# Patient Record
Sex: FEMALE | Employment: UNEMPLOYED | URBAN - METROPOLITAN AREA
[De-identification: names, ages, dates, MRNs, and addresses within clinical notes are randomized per-mention and may not be internally consistent; named-entity substitution may affect disease eponyms.]

---

## 2019-07-17 ENCOUNTER — HOSPITAL ENCOUNTER (OUTPATIENT)
Dept: LAB | Age: 49
Discharge: HOME | End: 2019-07-17
Payer: MEDICARE

## 2019-07-17 ENCOUNTER — HOSPITAL ENCOUNTER (OUTPATIENT)
Dept: GENERAL RADIOLOGY | Age: 49
Discharge: HOME | End: 2019-07-17
Payer: MEDICARE

## 2019-07-17 DIAGNOSIS — J45.50 SEVERE PERSISTENT ASTHMA, UNCOMPLICATED: ICD-10-CM

## 2019-07-17 LAB
BASOPHILS # BLD: 0 K/UL (ref 0–0.1)
BASOPHILS NFR BLD: 1 % (ref 0–2)
DIFFERENTIAL METHOD BLD: NORMAL
EOSINOPHIL # BLD: 0.2 K/UL (ref 0–0.4)
EOSINOPHIL NFR BLD: 3 % (ref 0–5)
ERYTHROCYTE [DISTWIDTH] IN BLOOD BY AUTOMATED COUNT: 14.1 % (ref 11.6–14.5)
HCT VFR BLD AUTO: 39.9 % (ref 35–45)
HGB BLD-MCNC: 12.6 G/DL (ref 12–16)
LYMPHOCYTES # BLD: 1.6 K/UL (ref 0.9–3.6)
LYMPHOCYTES NFR BLD: 27 % (ref 21–52)
MCH RBC QN AUTO: 26.8 PG (ref 24–34)
MCHC RBC AUTO-ENTMCNC: 31.6 G/DL (ref 31–37)
MCV RBC AUTO: 84.9 FL (ref 74–97)
MONOCYTES # BLD: 0.5 K/UL (ref 0.05–1.2)
MONOCYTES NFR BLD: 9 % (ref 3–10)
NEUTS SEG # BLD: 3.5 K/UL (ref 1.8–8)
NEUTS SEG NFR BLD: 60 % (ref 40–73)
PLATELET # BLD AUTO: 281 K/UL (ref 135–420)
PMV BLD AUTO: 10.9 FL (ref 9.2–11.8)
RBC # BLD AUTO: 4.7 M/UL (ref 4.2–5.3)
WBC # BLD AUTO: 5.8 K/UL (ref 4.6–13.2)

## 2019-07-17 PROCEDURE — 71046 X-RAY EXAM CHEST 2 VIEWS: CPT

## 2019-07-17 PROCEDURE — 82785 ASSAY OF IGE: CPT

## 2019-07-17 PROCEDURE — 36415 COLL VENOUS BLD VENIPUNCTURE: CPT

## 2019-07-17 PROCEDURE — 86003 ALLG SPEC IGE CRUDE XTRC EA: CPT

## 2019-07-17 PROCEDURE — 85025 COMPLETE CBC W/AUTO DIFF WBC: CPT

## 2019-07-20 LAB
A ALTERNATA IGE QN: <0.1 KU/L
A FUMIGATUS IGE QN: <0.1 KU/L
AMER ROACH IGE QN: <0.1 KU/L
AMER SYCAMORE IGE QN: <0.1 KU/L
BAHIA GRASS IGE QN: <0.1 KU/L
BERMUDA GRASS IGE QN: <0.1 KU/L
BOXELDER IGE QN: <0.1 KU/L
C HERBARUM IGE QN: <0.1 KU/L
CAT DANDER IGG QN: <0.1 KU/L
CLASS DESCRIPTION, 600268: ABNORMAL
COMMON RAGWEED IGE QN: <0.1 KU/L
D FARINAE IGE QN: 8.6 KU/L
D PTERONYSS IGE QN: 21.2 KU/L
DEPRECATED IGE QN: <0.1 KU/L
DOG DANDER IGE QN: <0.1 KU/L
ENGL PLANTAIN IGE QN: <0.1 KU/L
IGE SERPL-ACNC: 121 IU/ML (ref 6–495)
JOHNSON GRASS IGE QN: <0.1 KU/L
M RACEMOSUS IGE QN: <0.1 KU/L
MT JUNIPER IGE QN: <0.1 KU/L
MUGWORT IGE QN: <0.1 KU/L
NETTLE IGE QN: <0.1 KU/L
P NOTATUM IGE QN: <0.1 KU/L
S BOTRYOSUM IGE QN: <0.1 KU/L
SHEEP SORREL IGE QN: <0.1 KU/L
SWEET GUM IGE QN: <0.1 KU/L
TIMOTHY IGE QN: <0.1 KU/L
WHITE BIRCH IGE QN: <0.1 KU/L
WHITE ELM IGG QN: <0.1 KU/L
WHITE HICKORY IGE QN: <0.1 KU/L
WHITE MULBERRY IGE QN: <0.1 KU/L
WHITE OAK IGE QN: <0.1 KU/L

## 2019-10-10 ENCOUNTER — OFFICE VISIT (OUTPATIENT)
Dept: FAMILY MEDICINE CLINIC | Age: 49
End: 2019-10-10

## 2019-10-10 VITALS
TEMPERATURE: 97.4 F | OXYGEN SATURATION: 97 % | WEIGHT: 206 LBS | HEIGHT: 57 IN | DIASTOLIC BLOOD PRESSURE: 125 MMHG | SYSTOLIC BLOOD PRESSURE: 147 MMHG | RESPIRATION RATE: 14 BRPM | BODY MASS INDEX: 44.44 KG/M2 | HEART RATE: 90 BPM

## 2019-10-10 DIAGNOSIS — F41.9 ANXIETY AND DEPRESSION: Primary | ICD-10-CM

## 2019-10-10 DIAGNOSIS — J45.909 PERSISTENT ASTHMA WITHOUT COMPLICATION, UNSPECIFIED ASTHMA SEVERITY: ICD-10-CM

## 2019-10-10 DIAGNOSIS — R03.0 ELEVATED BP WITHOUT DIAGNOSIS OF HYPERTENSION: ICD-10-CM

## 2019-10-10 DIAGNOSIS — Z12.39 BREAST CANCER SCREENING: ICD-10-CM

## 2019-10-10 DIAGNOSIS — F32.A ANXIETY AND DEPRESSION: Primary | ICD-10-CM

## 2019-10-10 DIAGNOSIS — Z12.31 ENCOUNTER FOR SCREENING MAMMOGRAM FOR MALIGNANT NEOPLASM OF BREAST: ICD-10-CM

## 2019-10-10 DIAGNOSIS — Z23 ENCOUNTER FOR IMMUNIZATION: ICD-10-CM

## 2019-10-10 DIAGNOSIS — E04.9 NODULAR GOITER: ICD-10-CM

## 2019-10-10 DIAGNOSIS — K21.9 GASTROESOPHAGEAL REFLUX DISEASE, ESOPHAGITIS PRESENCE NOT SPECIFIED: ICD-10-CM

## 2019-10-10 DIAGNOSIS — E03.9 HYPOTHYROIDISM, UNSPECIFIED TYPE: ICD-10-CM

## 2019-10-10 DIAGNOSIS — Z13.6 SCREENING FOR HEART DISEASE: ICD-10-CM

## 2019-10-10 RX ORDER — CITALOPRAM 20 MG/1
20 TABLET, FILM COATED ORAL DAILY
Qty: 90 TAB | Refills: 3 | Status: SHIPPED | OUTPATIENT
Start: 2019-10-10 | End: 2020-09-09 | Stop reason: SDUPTHER

## 2019-10-10 RX ORDER — QUETIAPINE FUMARATE 300 MG/1
300 TABLET, FILM COATED ORAL
Qty: 90 TAB | Refills: 3 | Status: SHIPPED | OUTPATIENT
Start: 2019-10-10 | End: 2020-02-19 | Stop reason: SDUPTHER

## 2019-10-10 RX ORDER — LEVOTHYROXINE SODIUM 100 UG/1
100 TABLET ORAL
COMMUNITY
End: 2019-12-02 | Stop reason: DRUGHIGH

## 2019-10-10 RX ORDER — BUDESONIDE AND FORMOTEROL FUMARATE DIHYDRATE 160; 4.5 UG/1; UG/1
2 AEROSOL RESPIRATORY (INHALATION) 2 TIMES DAILY
COMMUNITY

## 2019-10-10 RX ORDER — PHENOL/SODIUM PHENOLATE
20 AEROSOL, SPRAY (ML) MUCOUS MEMBRANE DAILY
COMMUNITY
End: 2019-10-10 | Stop reason: ALTCHOICE

## 2019-10-10 RX ORDER — RANITIDINE 300 MG/1
300 TABLET ORAL
Qty: 90 TAB | Refills: 3 | Status: SHIPPED | OUTPATIENT
Start: 2019-10-10 | End: 2020-09-17

## 2019-10-10 RX ORDER — QUETIAPINE FUMARATE 300 MG/1
300 TABLET, FILM COATED ORAL
COMMUNITY
End: 2019-10-10 | Stop reason: SDUPTHER

## 2019-10-10 RX ORDER — ALBUTEROL SULFATE 0.83 MG/ML
SOLUTION RESPIRATORY (INHALATION)
COMMUNITY

## 2019-10-10 RX ORDER — ALBUTEROL SULFATE 90 UG/1
AEROSOL, METERED RESPIRATORY (INHALATION)
COMMUNITY

## 2019-10-10 RX ORDER — MONTELUKAST SODIUM 10 MG/1
10 TABLET ORAL DAILY
COMMUNITY
End: 2020-01-08 | Stop reason: SDUPTHER

## 2019-10-10 RX ORDER — CITALOPRAM 20 MG/1
20 TABLET, FILM COATED ORAL DAILY
COMMUNITY
End: 2019-10-10 | Stop reason: SDUPTHER

## 2019-10-10 NOTE — PROGRESS NOTES
Lili Clark    CC: EOC for chronic disease management    HPI:     Asthma:  -Currently seeing Dr. Farideh Landers with pulmonary medicine  -Currently taking Symbicort, Singulair, and Spiriva  -States that asthma was recently exacerbated by a cold  -Next appointment with pulmonary medicine is on 10/29/2019      Thyroid Disease:  -Reports she was seeing endocrinology in [facility]  -Reports having hypothyroidism and being on levothyroxine for issue  -Has been off of levothyroxine for 4 weeks  -Concerned about enlargement of her thyroid as she is having issues with choking/breathing/swallowing  -Has history of multinodular goiter which was found on thyroid ultrasound on 11/15/2016  -Had a fine-needle biopsy done on 1/20/2017 that showed chronic lymphocytic thyroiditis  -Reports her family history is significant for her father and 2 sisters having thyroid disease  -States that one of her sisters was found to have thyroid cancer at 45 y.o.       GERD:  -Associated with a hiatal hernia (was found on EGD)  -Had been previously taking Prilosec for issue, but ran out of her medication  -Currently taking Gaviscon as needed for symptoms      Anxiety and Depression:  -Associated with insomnia  -Was seeing psychiatry in [facility]  -Has not established yet with any local psychiatrist  -Has been out of all of her psychiatric medication  -Denies any side effects from her psychiatric medications  -Reports that her mood issues were well controlled on her prior regimen  -Currently reports her psychiatric issues have been inadequately controlled      ROS: Positive items marked in RED  CON: fever, chills  Cardiovascular: palpitations, CP  Resp: SOB, cough  GI: nausea, vomiting, diarrhea  : dysuria, hematuria      Past Medical History:   Diagnosis Date    Allergies     Anxiety     Asthma     Chronic lymphocytic thyroiditis     1/13/2017    Depression     GERD (gastroesophageal reflux disease)     Goiter, nodular 11/15/2016    Hiatal hernia     Hypothyroidism     Insomnia        Past Surgical History:   Procedure Laterality Date    HX  SECTION  2004    HX  SECTION  2006    HX HYSTERECTOMY         Family History   Problem Relation Age of Onset    Thyroid Disease Father     Thyroid Disease Sister     Thyroid Cancer Sister 45       Social History     Socioeconomic History    Marital status:      Spouse name: Not on file    Number of children: Not on file    Years of education: Not on file    Highest education level: Not on file   Occupational History    Not on file   Social Needs    Financial resource strain: Not on file    Food insecurity:     Worry: Not on file     Inability: Not on file    Transportation needs:     Medical: Not on file     Non-medical: Not on file   Tobacco Use    Smoking status: Never Smoker    Smokeless tobacco: Never Used   Substance and Sexual Activity    Alcohol use: Never     Frequency: Never    Drug use: Never    Sexual activity: Yes     Partners: Male     Birth control/protection: Surgical   Lifestyle    Physical activity:     Days per week: Not on file     Minutes per session: Not on file    Stress: Not on file   Relationships    Social connections:     Talks on phone: Not on file     Gets together: Not on file     Attends Mu-ism service: Not on file     Active member of club or organization: Not on file     Attends meetings of clubs or organizations: Not on file     Relationship status: Not on file    Intimate partner violence:     Fear of current or ex partner: Not on file     Emotionally abused: Not on file     Physically abused: Not on file     Forced sexual activity: Not on file   Other Topics Concern    Not on file   Social History Narrative    Not on file       No Known Allergies      Current Outpatient Medications:     levothyroxine (SYNTHROID) 100 mcg tablet, Take 100 mcg by mouth Daily (before breakfast). , Disp: , Rfl:     montelukast (SINGULAIR) 10 mg tablet, Take 10 mg by mouth daily. , Disp: , Rfl:     QUEtiapine (SEROQUEL) 300 mg tablet, Take 300 mg by mouth nightly., Disp: , Rfl:     citalopram (CELEXA) 20 mg tablet, Take 20 mg by mouth daily. , Disp: , Rfl:     albuterol (PROVENTIL VENTOLIN) 2.5 mg /3 mL (0.083 %) nebu, by Nebulization route., Disp: , Rfl:     albuterol (VENTOLIN HFA) 90 mcg/actuation inhaler, Take  by inhalation. , Disp: , Rfl:     budesonide-formoterol (SYMBICORT) 160-4.5 mcg/actuation HFAA, Take 2 Puffs by inhalation two (2) times a day., Disp: , Rfl:     tiotropium (SPIRIVA WITH HANDIHALER) 18 mcg inhalation capsule, Take 1 Cap by inhalation daily. , Disp: , Rfl:     Omeprazole delayed release (PRILOSEC D/R) 20 mg tablet, Take 20 mg by mouth daily. , Disp: , Rfl:     Physical Exam:      BP (!) 147/125   Pulse 90   Temp 97.4 °F (36.3 °C) (Oral)   Resp 14   Ht 4' 9\" (1.448 m)   Wt 206 lb (93.4 kg)   SpO2 97%   BMI 44.58 kg/m²     General: obese habitus, NAD, conversant  Eyes: sclera clear bilaterally, no discharge noted, eyelids normal in appearance  HENT: NCAT  Lungs: CTAB, normal respiratory effort and rate  CV: RRR, no MRGs  ABD: soft, non-tender, non-distended, normal bowel sounds  Skin: normal temperature, turgor, color, and texture  Psych: alert and oriented to person, place and situation, normal affect  Neuro: speech normal, moving all extremities, gait normal      Assessment/Plan     Anxiety and Depression, Inadequately Controlled:  -EKG ordered and was negative for any QT prolongation  -Will resume on prior psychiatric medication regimen   -Given information on how to find a local psychiatrist that accepts her insurance  -Follow-up in 1 month      GERD, Inadequately Controlled:  -Likely cause of reported dysphasia  -Will officially discontinue prior Prilosec regimen  -Started on trial of ranitidine regimen  -Follow-up in 1 month      Hypothyroidism:  -Likely inadequately controlled based on history  -TSH and free T4 ordered  -Follow-up in 1 month      Multinodular Goiter:  -Thyroid ultrasound ordered  -TSH and free T4 ordered  -Follow-up in 1 month      Elevated BP:  -Suspect elevated BP is due to uncontrolled anxiety  -Will resume on prior anxiety medication  -Follow-up in 1 month      Persistent Asthma:  -Will defer management to pulmonary specialist  -Need to obtain records from pulmonary specialist for review  -Follow-up in 1 month      Health Maintenance:  -Mammogram ordered  -Influenza vaccine administered today        Stacy Vasquez MD  10/10/2019, 11:29 AM

## 2019-10-10 NOTE — PROGRESS NOTES
1. Have you been to the ER, urgent care clinic since your last visit? Hospitalized since your last visit? No    2. Have you seen or consulted any other health care providers outside of the [address] [place] [address] since your last visit? Include any pap smears or colon screening.  No

## 2019-10-22 ENCOUNTER — HOSPITAL ENCOUNTER (OUTPATIENT)
Dept: ULTRASOUND IMAGING | Age: 49
Discharge: HOME | End: 2019-10-22
Attending: FAMILY MEDICINE
Payer: MEDICARE

## 2019-10-22 ENCOUNTER — HOSPITAL ENCOUNTER (OUTPATIENT)
Dept: MAMMOGRAPHY | Age: 49
Discharge: HOME | End: 2019-10-22
Attending: FAMILY MEDICINE
Payer: MEDICARE

## 2019-10-22 DIAGNOSIS — Z12.39 BREAST CANCER SCREENING: ICD-10-CM

## 2019-10-22 DIAGNOSIS — Z12.31 ENCOUNTER FOR SCREENING MAMMOGRAM FOR MALIGNANT NEOPLASM OF BREAST: ICD-10-CM

## 2019-10-22 DIAGNOSIS — E04.9 NODULAR GOITER: ICD-10-CM

## 2019-10-22 PROCEDURE — 76536 US EXAM OF HEAD AND NECK: CPT

## 2019-10-22 PROCEDURE — 77063 BREAST TOMOSYNTHESIS BI: CPT

## 2019-10-25 PROBLEM — J45.909 PERSISTENT ASTHMA WITHOUT COMPLICATION: Status: ACTIVE | Noted: 2019-10-25

## 2019-10-25 PROBLEM — F41.9 ANXIETY AND DEPRESSION: Status: ACTIVE | Noted: 2019-10-25

## 2019-10-25 PROBLEM — F32.A ANXIETY AND DEPRESSION: Status: ACTIVE | Noted: 2019-10-25

## 2019-10-25 PROBLEM — K21.9 GASTROESOPHAGEAL REFLUX DISEASE: Status: ACTIVE | Noted: 2019-10-25

## 2019-11-15 ENCOUNTER — HOSPITAL ENCOUNTER (OUTPATIENT)
Dept: LAB | Age: 49
Discharge: HOME | End: 2019-11-15
Payer: MEDICARE

## 2019-11-15 DIAGNOSIS — E04.9 NODULAR GOITER: ICD-10-CM

## 2019-11-15 PROCEDURE — 36415 COLL VENOUS BLD VENIPUNCTURE: CPT

## 2019-11-15 PROCEDURE — 84439 ASSAY OF FREE THYROXINE: CPT

## 2019-11-16 LAB
T4 FREE SERPL-MCNC: 0.7 NG/DL (ref 0.7–1.5)
TSH SERPL DL<=0.05 MIU/L-ACNC: 6.97 UIU/ML (ref 0.36–3.74)

## 2019-11-22 ENCOUNTER — OFFICE VISIT (OUTPATIENT)
Dept: FAMILY MEDICINE CLINIC | Age: 49
End: 2019-11-22

## 2019-11-22 VITALS
TEMPERATURE: 96.6 F | OXYGEN SATURATION: 99 % | SYSTOLIC BLOOD PRESSURE: 117 MMHG | HEIGHT: 57 IN | DIASTOLIC BLOOD PRESSURE: 73 MMHG | HEART RATE: 86 BPM | WEIGHT: 196 LBS | RESPIRATION RATE: 14 BRPM | BODY MASS INDEX: 42.28 KG/M2

## 2019-11-22 DIAGNOSIS — F32.A ANXIETY AND DEPRESSION: ICD-10-CM

## 2019-11-22 DIAGNOSIS — K21.9 GASTROESOPHAGEAL REFLUX DISEASE, ESOPHAGITIS PRESENCE NOT SPECIFIED: ICD-10-CM

## 2019-11-22 DIAGNOSIS — Z23 ENCOUNTER FOR IMMUNIZATION: ICD-10-CM

## 2019-11-22 DIAGNOSIS — F41.9 ANXIETY AND DEPRESSION: ICD-10-CM

## 2019-11-22 DIAGNOSIS — E03.9 HYPOTHYROIDISM, UNSPECIFIED TYPE: Primary | ICD-10-CM

## 2019-11-22 NOTE — PROGRESS NOTES
1. Have you been to the ER, urgent care clinic since your last visit? Hospitalized since your last visit? No    2. Have you seen or consulted any other health care providers outside of the [address] [place] [address] since your last visit? Include any pap smears or colon screening.  No

## 2019-11-22 NOTE — PROGRESS NOTES
[facility]    CC: F/U for chronic disease management    HPI:     Anxiety and Depression:  -Taking psychiatric medication as prescribed  -Denies any side effects or issues with the medication  -Reports mood has improved with medication  -Has not made an appointment with a therapist/psychiatrist       GERD:  -Taking ranitidine for issue  -Denies any side effects or issues with the medication  -Reports symptoms are well controlled with medication       Hypothyroidism:  -Currently on no thyroid medication  -Got requested lab work  -She has been dealing with notable dry skin and weight gain  -Got requested thyroid ultrasound for evaluation of reported multinodular       ROS: Positive items marked in RED  CON: fever, chills  Cardiovascular: palpitations, CP  Resp: SOB, cough  GI: nausea, vomiting, diarrhea  : dysuria, hematuria      Past Medical History:   Diagnosis Date    Allergies     Anxiety     Asthma     Chronic lymphocytic thyroiditis     2017    Depression     GERD (gastroesophageal reflux disease)     Goiter, nodular 11/15/2016    Hiatal hernia     Hypothyroidism     Insomnia     Morbid obesity ([address]        Past Surgical History:   Procedure Laterality Date    HX  SECTION      HX  SECTION  2006    HX HYSTERECTOMY         Family History   Problem Relation Age of Onset    Thyroid Disease Father     Thyroid Disease Sister     Thyroid Cancer Sister 45       Social History     Socioeconomic History    Marital status:      Spouse name: Not on file    Number of children: Not on file    Years of education: Not on file    Highest education level: Not on file   Tobacco Use    Smoking status: Never Smoker    Smokeless tobacco: Never Used   Substance and Sexual Activity    Alcohol use: Never     Frequency: Never    Drug use: Never    Sexual activity: Yes     Partners: Male     Birth control/protection: Surgical       No Known Allergies      Current Outpatient Medications:     levothyroxine (SYNTHROID) 100 mcg tablet, Take 100 mcg by mouth Daily (before breakfast). , Disp: , Rfl:     montelukast (SINGULAIR) 10 mg tablet, Take 10 mg by mouth daily. , Disp: , Rfl:     albuterol (PROVENTIL VENTOLIN) 2.5 mg /3 mL (0.083 %) nebu, by Nebulization route., Disp: , Rfl:     albuterol (VENTOLIN HFA) 90 mcg/actuation inhaler, Take  by inhalation. , Disp: , Rfl:     budesonide-formoterol (SYMBICORT) 160-4.5 mcg/actuation HFAA, Take 2 Puffs by inhalation two (2) times a day., Disp: , Rfl:     tiotropium (SPIRIVA WITH HANDIHALER) 18 mcg inhalation capsule, Take 1 Cap by inhalation daily. , Disp: , Rfl:     citalopram (CELEXA) 20 mg tablet, Take 1 Tab by mouth daily. , Disp: 90 Tab, Rfl: 3    QUEtiapine (SEROQUEL) 300 mg tablet, Take 1 Tab by mouth nightly., Disp: 90 Tab, Rfl: 3    raNITIdine (ZANTAC) 300 mg tab, Take 1 Tab by mouth nightly., Disp: 90 Tab, Rfl: 3    Physical Exam:      /73   Pulse 86   Temp 96.6 °F (35.9 °C) (Oral)   Resp 14   Ht 4' 9\" (1.448 m)   Wt 196 lb (88.9 kg)   LMP 09/25/2019 Comment: Tubal Ligation  SpO2 99%   BMI 42.41 kg/m²     General: obese habitus, NAD, conversant  Eyes: sclera clear bilaterally, no discharge noted, eyelids normal in appearance  HENT: NCAT  Lungs: CTAB, normal respiratory effort and rate  CV: RRR, no MRGs  ABD: soft, non-tender, non-distended, normal bowel sounds  Skin: normal temperature, turgor, color, and texture  Psych: alert and oriented to person, place and situation, normal affect  Neuro: speech normal, moving all extremities, gait normal    Results for Radha Clarke (MRN [id number]):   Ref. Range 11/15/2019 09:28   T4, Free Latest Ref Range: 0.7 - 1.5 NG/DL 0.7   TSH Latest Ref Range: 0.36 - 3.74 uIU/mL 6.97 (H)       US THYROID/PARATHYROID/SOFT TISS (10/22/2019): IMPRESSION:  1.  Heterogeneous thyroid echotexture diffusely without discrete focal lesion.     2. Perhaps mildly increased color Doppler flow diffusely, perhaps reflecting  thyroiditis. Clinical laboratory correlation recommended.     Assessment/Plan     Hypothyroidism, likely inadequately controlled:  -Last labs consistent with subclinical hypothyroidism  -Given patient's worsening symptoms, will check TSH, free T4, and thyroid antibody panel  -Suspect patient has Hashimoto's thyroiditis given probable thyroiditis on ultrasound  -Follow-up in 1 month      GERD, well controlled:  -Will continue current ranitidine regimen  -Follow-up in 1 month      Anxiety and Depression, well controlled:  -Will continue current psychiatric medication regimen  -Follow-up in 1 month      Health Maintenance:  -Pneumovax 23 administered today        Ainsley Villafuerte MD  11/22/2019, 8:16 AM

## 2019-11-24 LAB
T4 FREE SERPL-MCNC: 0.78 NG/DL (ref 0.82–1.77)
THYROGLOB AB SERPL-ACNC: <1 IU/ML (ref 0–0.9)
THYROPEROXIDASE AB SERPL-ACNC: 350 IU/ML (ref 0–34)
TSH SERPL DL<=0.005 MIU/L-ACNC: 4.75 UIU/ML (ref 0.45–4.5)

## 2019-12-02 RX ORDER — LEVOTHYROXINE SODIUM 100 UG/1
100 TABLET ORAL
Qty: 90 TAB | Refills: 1 | Status: SHIPPED | OUTPATIENT
Start: 2019-12-02 | End: 2020-01-08 | Stop reason: DRUGHIGH

## 2019-12-02 RX ORDER — LEVOTHYROXINE SODIUM 125 UG/1
125 TABLET ORAL
Qty: 90 TAB | Refills: 1 | Status: SHIPPED | OUTPATIENT
Start: 2019-12-02 | End: 2019-12-02 | Stop reason: CLARIF

## 2020-01-08 ENCOUNTER — OFFICE VISIT (OUTPATIENT)
Dept: FAMILY MEDICINE CLINIC | Age: 50
End: 2020-01-08

## 2020-01-08 VITALS
RESPIRATION RATE: 14 BRPM | HEART RATE: 92 BPM | HEIGHT: 57 IN | OXYGEN SATURATION: 99 % | TEMPERATURE: 96.6 F | BODY MASS INDEX: 42.28 KG/M2 | DIASTOLIC BLOOD PRESSURE: 76 MMHG | SYSTOLIC BLOOD PRESSURE: 127 MMHG | WEIGHT: 196 LBS

## 2020-01-08 DIAGNOSIS — E03.8 HYPOTHYROIDISM DUE TO HASHIMOTO'S THYROIDITIS: Primary | ICD-10-CM

## 2020-01-08 DIAGNOSIS — E06.3 HYPOTHYROIDISM DUE TO HASHIMOTO'S THYROIDITIS: Primary | ICD-10-CM

## 2020-01-08 DIAGNOSIS — J30.9 ALLERGIC RHINITIS, UNSPECIFIED SEASONALITY, UNSPECIFIED TRIGGER: ICD-10-CM

## 2020-01-08 RX ORDER — LEVOTHYROXINE SODIUM 125 UG/1
125 TABLET ORAL
Qty: 30 TAB | Refills: 4 | Status: SHIPPED | OUTPATIENT
Start: 2020-01-08 | End: 2020-05-20 | Stop reason: SDUPTHER

## 2020-01-08 RX ORDER — MONTELUKAST SODIUM 10 MG/1
10 TABLET ORAL DAILY
Qty: 90 TAB | Refills: 2 | Status: SHIPPED | OUTPATIENT
Start: 2020-01-08 | End: 2020-02-19 | Stop reason: SDUPTHER

## 2020-01-08 NOTE — PATIENT INSTRUCTIONS
Tiroiditis de Hashimoto: Instrucciones de cuidado - [ Hashimoto's Thyroiditis: Care Instructions ]  Instrucciones de cuidado    La tiroiditis de Hashimoto es un problema en la tiroides. La tiroides, situada en el lucho, controla la [employer] en la que el organismo utiliza la energía. A veces, la enfermedad causa que la glándula produzca demasiada hormona tiroidea (tirotoxicosis). [place] podría hacerle sentir nervioso, bajar de peso y provocarle muchas evacuaciones intestinales blandas. También podría tener latidos rápidos del corazón. Vincent a medida que la enfermedad avanza, la glándula generalmente no produce suficiente hormona tiroidea. [place] puede hacer que usted se sienta cansado y tenga piel seca y jesica adelgazado. A la mayoría de las personas se les diagnostica la enfermedad de Hashimoto cuando tienen estos síntomas. Podría necesitar sarah medicamentos si tiene síntomas o si saleh nivel de hormona tiroidea no es normal. La mayoría de las personas con tiroiditis de Hashimoto necesitan sarah medicamentos de por stevie. La atención de seguimiento es karen parte clave de saleh tratamiento y seguridad. Asegúrese de hacer y acudir a todas las citas, y llame a saleh médico si está teniendo problemas. También es karen buena idea saber los resultados de arnold exámenes y mantener karen lista de los medicamentos que marilyn. ¿Cómo puede cuidarse en el hogar? · [place] arnold medicamentos exactamente xochitl le fueron recetados. Llame a saleh médico si mary estar teniendo problemas con saleh medicamento. [employer]ib[place] medicamentos específicos recetados por saleh médico.  ¿Cuándo debe pedir ayuda? Llame al 911 en cualquier momento que considere que necesita atención de emergencia. Por ejemplo, llame si:    · Se desmayó (perdió el conocimiento).     · Tiene graves dificultades para respirar.     · Tiene un ritmo cardíaco demasiado bajo (menos de 60 latidos por minuto).     · Tiene karen temperatura corporal baja (95°F [35°C] o octavio).  Preste especial atención a los cambios en saleh mohsen y asegúrese de comunicarse con saleh médico si:    · Aumenta de peso a pesar de que come con normalidad o menos de lo normal.     · Se siente extremadamente débil o cansado.     · Tiene cambios nuevos en la piel, las uñas o el pelo, o estos cambios [place].     · Nota que la glándula tiroidea ha crecido o [place] de tamaño.     · Tiene estreñimiento nuevo o que empeora.     · No puede soportar las bajas temperaturas.     · Tiene períodos menstruales abundantes o irregulares.     · Tiene otros síntomas nuevos. ¿Dónde puede encontrar más información en inglés? Dominic Bobby a [email or web address]-yady.info/. Prateek Porter R909 en la búsqueda para aprender más acerca de \"Tiroiditis de Hashimoto: Instrucciones de cuidado - [ Hashimoto's Thyroiditis: Care Instructions ]. \"  Revisado: 6 noviembre, 2018  Versión del contenido: 12.2  © 2310-3605 [employer]wise[employer] Inc[employer]. Las instrucciones de cuidado fueron adaptadas bajo licencia por [employer] (which disclaims liability or warranty for this information). Si usted tiene [place] afección médica o sobre estas instrucciones, siempre pregunte a saleh profesional de mohsen. [employer] niega toda garantía o responsabilidad por saleh uso de esta información.

## 2020-01-08 NOTE — PROGRESS NOTES
1. Have you been to the ER, urgent care clinic since your last visit? Hospitalized since your last visit? No    2. Have you seen or consulted any other health care providers outside of the [address] [place] [address] since your last visit? Include any pap smears or colon screening.  No

## 2020-01-27 PROBLEM — E06.3 HASHIMOTO'S THYROIDITIS: Status: ACTIVE | Noted: 2020-01-27

## 2020-01-27 PROBLEM — E03.8 HYPOTHYROIDISM DUE TO HASHIMOTO'S THYROIDITIS: Status: ACTIVE | Noted: 2020-01-27

## 2020-01-27 NOTE — PROGRESS NOTES
[facility]    CC: Follow-up of Hypothyroidism    HPI:     Hypothyroidism:  -Got requested lab work  -Patient reports she does not know etiology of her hypothyroidism, but chart review shows previous diagnosis of chronic lymphocytic hypothyroidism on 2017  -Taking levothyroxine as prescribed  -Denies any side effects or issues with the medication      ROS: Positive items marked in RED  CON: fever, chills  Cardiovascular: palpitations, CP  Resp: SOB, cough  GI: nausea, vomiting, diarrhea  : dysuria, hematuria      Past Medical History:   Diagnosis Date    Allergies     Anxiety     Asthma     Chronic lymphocytic thyroiditis     2017    Depression     GERD (gastroesophageal reflux disease)     Goiter, nodular 11/15/2016    Hiatal hernia     Hypothyroidism     Insomnia     Morbid [address] ([facility] [address]        Past Surgical History:   Procedure Laterality Date    HX  SECTION      HX  SECTION  2006    HX HYSTERECTOMY         Family History   Problem Relation Age of Onset    Thyroid Disease Father     Thyroid Disease Sister     Thyroid Cancer Sister 45       Social History     Socioeconomic History    Marital status:      Spouse name: Not on file    Number of children: Not on file    Years of education: Not on file    Highest education level: Not on file   Tobacco Use    Smoking status: Never Smoker    Smokeless tobacco: Never Used   Substance and Sexual Activity    Alcohol use: Never     Frequency: Never    Drug use: Never    Sexual activity: Yes     Partners: Male     Birth control/protection: Surgical       No Known Allergies      Current Outpatient Medications:     levothyroxine (SYNTHROID) 125 mcg tablet, Take 1 Tab by mouth Daily (before breakfast). , Disp: 30 Tab, Rfl: 4    montelukast (SINGULAIR) 10 mg tablet, Take 1 Tab by mouth daily.  Indications: inflammation of the nose due to an allergy, Disp: 90 Tab, Rfl: 2    budesonide-formoterol (SYMBICORT) 160-4.5 mcg/actuation HFAA, Take 2 Puffs by inhalation two (2) times a day., Disp: , Rfl:     tiotropium (SPIRIVA WITH HANDIHALER) 18 mcg inhalation capsule, Take 1 Cap by inhalation daily. , Disp: , Rfl:     citalopram (CELEXA) 20 mg tablet, Take 1 Tab by mouth daily. , Disp: 90 Tab, Rfl: 3    QUEtiapine (SEROQUEL) 300 mg tablet, Take 1 Tab by mouth nightly., Disp: 90 Tab, Rfl: 3    albuterol (PROVENTIL VENTOLIN) 2.5 mg /3 mL (0.083 %) nebu, by Nebulization route., Disp: , Rfl:     albuterol (VENTOLIN HFA) 90 mcg/actuation inhaler, Take  by inhalation. , Disp: , Rfl:     raNITIdine (ZANTAC) 300 mg tab, Take 1 Tab by mouth nightly., Disp: 90 Tab, Rfl: 3    Physical Exam:      /76   Pulse 92   Temp 96.6 °F (35.9 °C) (Oral)   Resp 14   Ht 4' 9\" (1.448 m)   Wt 196 lb (88.9 kg)   LMP 09/25/2019 Comment: Tubal Ligation  SpO2 99%   BMI 42.41 kg/m²     General: obese habitus, NAD, conversant  Eyes: sclera clear bilaterally, no discharge noted, eyelids normal in appearance  HENT: NCAT  Lungs: CTAB, normal respiratory effort and rate  CV: RRR, no MRGs  ABD: soft, non-tender, non-distended, normal bowel sounds  Skin: normal temperature, turgor, color, and texture  Psych: alert and oriented to person, place and situation, normal affect  Neuro: speech normal, moving all extremities, gait normal    Results for Scott Green (MRN [id number]):   Ref.  Range 11/22/2019 09:55   T4, Free Latest Ref Range: 0.82 - 1.77 ng/dL 0.78 (L)   TSH Latest Ref Range: 0.450 - 4.500 uIU/mL 4.750 (H)       THYROID ANTIBODY PANEL (11/22/2019):  Component Value Flag Ref Range Units Status   Thyroid peroxidase Ab 350  High   0 - 34 IU/mL Final   Thyroglobulin Ab <1.0   0.0 - 0.9 IU/mL Final       Assessment/Plan     Hashimoto's Thyroiditis, inadequately controlled:  -Counseled on diagnosis  -Levothyroxine dose increased to 125 mcg  -Timed TSH ordered  -Handout given on Hashimoto's thyroiditis care  -Follow-up in 6 weeks        Tammy Panchal MD  1/8/2020

## 2020-02-06 LAB — TSH SERPL DL<=0.005 MIU/L-ACNC: 0.53 UIU/ML (ref 0.45–4.5)

## 2020-02-19 ENCOUNTER — OFFICE VISIT (OUTPATIENT)
Dept: FAMILY MEDICINE CLINIC | Age: 50
End: 2020-02-19

## 2020-02-19 VITALS
HEIGHT: 57 IN | TEMPERATURE: 97.2 F | SYSTOLIC BLOOD PRESSURE: 123 MMHG | DIASTOLIC BLOOD PRESSURE: 52 MMHG | OXYGEN SATURATION: 98 % | HEART RATE: 86 BPM | RESPIRATION RATE: 18 BRPM | WEIGHT: 209 LBS | BODY MASS INDEX: 45.09 KG/M2

## 2020-02-19 DIAGNOSIS — J30.9 ALLERGIC RHINITIS, UNSPECIFIED SEASONALITY, UNSPECIFIED TRIGGER: ICD-10-CM

## 2020-02-19 DIAGNOSIS — F41.9 ANXIETY AND DEPRESSION: ICD-10-CM

## 2020-02-19 DIAGNOSIS — E03.8 HYPOTHYROIDISM DUE TO HASHIMOTO'S THYROIDITIS: Primary | ICD-10-CM

## 2020-02-19 DIAGNOSIS — E06.3 HYPOTHYROIDISM DUE TO HASHIMOTO'S THYROIDITIS: Primary | ICD-10-CM

## 2020-02-19 DIAGNOSIS — F32.A ANXIETY AND DEPRESSION: ICD-10-CM

## 2020-02-19 RX ORDER — QUETIAPINE FUMARATE 300 MG/1
300 TABLET, FILM COATED ORAL
Qty: 90 TAB | Refills: 3 | Status: SHIPPED | OUTPATIENT
Start: 2020-02-19 | End: 2020-09-09 | Stop reason: SDUPTHER

## 2020-02-19 RX ORDER — MONTELUKAST SODIUM 10 MG/1
10 TABLET ORAL DAILY
Qty: 90 TAB | Refills: 2 | Status: SHIPPED | OUTPATIENT
Start: 2020-02-19

## 2020-02-19 RX ORDER — DICLOFENAC SODIUM 75 MG/1
75 TABLET, DELAYED RELEASE ORAL 2 TIMES DAILY
Qty: 60 TAB | Refills: 2 | Status: SHIPPED | OUTPATIENT
Start: 2020-02-19 | End: 2020-09-09 | Stop reason: SDUPTHER

## 2020-02-19 NOTE — PROGRESS NOTES
1. Have you been to the ER, urgent care clinic since your last visit? Hospitalized since your last visit? No    2. Have you seen or consulted any other health care providers outside of the [address] [place] [address] since your last visit? Include any pap smears or colon screening.  No 5

## 2020-02-19 NOTE — PROGRESS NOTES
Italia [facility]    CC: Follow-up of Hypothyroidism    HPI:     Hypothyroidism:  -Got requested TSH level  -Taking levothyroxine as prescribed  -Denies any side effects or issues with the medication      ROS: Positive items marked in RED  CON: fever, chills  Cardiovascular: palpitations, CP  Resp: SOB, cough  GI: nausea, vomiting, diarrhea  : dysuria, hematuria      Past Medical History:   Diagnosis Date    Allergies     Anxiety     Asthma     Depression     GERD (gastroesophageal reflux disease)     Goiter, nodular 11/15/2016    Hiatal hernia     Hypothyroidism due to Hashimoto's thyroiditis 2017    Insomnia     Morbid obesity ([address]        Past Surgical History:   Procedure Laterality Date    HX  SECTION      HX  SECTION      HX TUBAL LIGATION         Family History   Problem Relation Age of Onset    Thyroid Disease Father     Thyroid Disease Sister     Thyroid Cancer Sister 45       Social History     Socioeconomic History    Marital status:      Spouse name: Not on file    Number of children: Not on file    Years of education: Not on file    Highest education level: Not on file   Tobacco Use    Smoking status: Never Smoker    Smokeless tobacco: Never Used   Substance and Sexual Activity    Alcohol use: Never     Frequency: Never    Drug use: Never    Sexual activity: Yes     Partners: Male     Birth control/protection: Surgical       No Known Allergies      Current Outpatient Medications:     levothyroxine (SYNTHROID) 125 mcg tablet, Take 1 Tab by mouth Daily (before breakfast). , Disp: 30 Tab, Rfl: 4    montelukast (SINGULAIR) 10 mg tablet, Take 1 Tab by mouth daily. Indications: inflammation of the nose due to an allergy, Disp: 90 Tab, Rfl: 2    albuterol (PROVENTIL VENTOLIN) 2.5 mg /3 mL (0.083 %) nebu, by Nebulization route., Disp: , Rfl:     albuterol (VENTOLIN HFA) 90 mcg/actuation inhaler, Take  by inhalation. , Disp: , Rfl:    budesonide-formoterol (SYMBICORT) 160-4.5 mcg/actuation HFAA, Take 2 Puffs by inhalation two (2) times a day., Disp: , Rfl:     tiotropium (SPIRIVA WITH HANDIHALER) 18 mcg inhalation capsule, Take 1 Cap by inhalation daily. , Disp: , Rfl:     citalopram (CELEXA) 20 mg tablet, Take 1 Tab by mouth daily. , Disp: 90 Tab, Rfl: 3    QUEtiapine (SEROQUEL) 300 mg tablet, Take 1 Tab by mouth nightly., Disp: 90 Tab, Rfl: 3    raNITIdine (ZANTAC) 300 mg tab, Take 1 Tab by mouth nightly., Disp: 90 Tab, Rfl: 3    Physical Exam:      /52   Pulse 86   Temp 97.2 °F (36.2 °C) (Oral)   Resp 18   Ht 4' 9\" (1.448 m)   Wt 209 lb (94.8 kg)   LMP 09/25/2019 Comment: Tubal Ligation  SpO2 98%   BMI 45.23 kg/m²     General: Obese habitus, NAD, conversant  Eyes: sclera clear bilaterally, no discharge noted, eyelids normal in appearance  HENT: NCAT  Lungs: CTAB, normal respiratory effort and rate  CV: RRR, no MRGs  ABD: soft, non-tender, non-distended, normal bowel sounds  Skin: normal temperature, turgor, color, and texture  Psych: alert and oriented to person, place and situation, normal affect  Neuro: speech normal, moving all extremities, gait normal      Assessment/Plan     Hypothyroidism, well controlled:  -Secondary to Hashimoto's thyroiditis  -Will continue current levothyroxine regimen  -Follow-up in 4 months for Medicare wellness visit        Jese Curiel MD  2/19/2020, 8:05 AM

## 2020-03-11 PROBLEM — J45.50 SEVERE PERSISTENT ASTHMA: Status: ACTIVE | Noted: 2020-03-11

## 2020-03-12 ENCOUNTER — HOSPITAL ENCOUNTER (OUTPATIENT)
Dept: INFUSION THERAPY | Age: 50
Discharge: HOME | End: 2020-03-12
Payer: MEDICARE

## 2020-03-12 VITALS
HEART RATE: 90 BPM | OXYGEN SATURATION: 97 % | RESPIRATION RATE: 18 BRPM | SYSTOLIC BLOOD PRESSURE: 104 MMHG | DIASTOLIC BLOOD PRESSURE: 64 MMHG | TEMPERATURE: 98.3 F

## 2020-03-12 DIAGNOSIS — J45.50 SEVERE PERSISTENT ASTHMA, UNSPECIFIED WHETHER COMPLICATED: Primary | ICD-10-CM

## 2020-03-12 DIAGNOSIS — J45.50 SEVERE PERSISTENT ASTHMA, UNSPECIFIED WHETHER COMPLICATED: ICD-10-CM

## 2020-03-12 PROCEDURE — 74011250636 HC RX REV CODE- 250/636: Performed by: NURSE PRACTITIONER

## 2020-03-12 PROCEDURE — 96372 THER/PROPH/DIAG INJ SC/IM: CPT

## 2020-03-12 RX ADMIN — BENRALIZUMAB 30 MG: 30 INJECTION, SOLUTION SUBCUTANEOUS at 09:50

## 2020-03-12 NOTE — PROGRESS NOTES
[facility] Progress Note    Date: 2020    Name: Ame Johnson    MRN: [id number]         : 1970    Manju Jones    Ms. Sierra Jacobo was assessed and education was provided. Ms. Jeppie Hashimoto vitals were reviewed and patient was observed for 5 minutes prior to treatment. Visit Vitals  /64 (BP 1 Location: Left arm, BP Patient Position: Sitting)   Pulse 90   Temp 98.3 °F (36.8 °C)   Resp 18   SpO2 97%       Lab results were obtained and reviewed. No results found for this or any previous visit (from the past 12 hour(s)). Fasenra 30 was administered subcutaneous in  Left upper arm. Band aid placed at site. Ms Maegan Cardozo was observed for 30 minutes after injection, no s/s of reaction occurred during this time period. Care note reviewed with patient regarding side effects to be aware of. Ms. Sierra Jacobo tolerated well, and had no complaints. Patient armband removed and shredded. Ms. Sierra Jacobo was discharged from [place] [address] in stable condition at 1025. She is to return on 20 at 0930 for her next appointment. She will be out of town the week of  when the injection is due.     Richard Douglas RN  2020  1:05 PM

## 2020-04-09 ENCOUNTER — APPOINTMENT (OUTPATIENT)
Dept: INFUSION THERAPY | Age: 50
End: 2020-04-09
Payer: MEDICARE

## 2020-04-09 DIAGNOSIS — J45.50 SEVERE PERSISTENT ASTHMA, UNSPECIFIED WHETHER COMPLICATED: ICD-10-CM

## 2020-04-16 ENCOUNTER — HOSPITAL ENCOUNTER (OUTPATIENT)
Dept: INFUSION THERAPY | Age: 50
Discharge: HOME | End: 2020-04-16
Payer: MEDICARE

## 2020-04-16 VITALS
RESPIRATION RATE: 18 BRPM | SYSTOLIC BLOOD PRESSURE: 120 MMHG | TEMPERATURE: 98.1 F | DIASTOLIC BLOOD PRESSURE: 73 MMHG | HEART RATE: 104 BPM | OXYGEN SATURATION: 97 %

## 2020-04-16 DIAGNOSIS — J45.50 SEVERE PERSISTENT ASTHMA, UNSPECIFIED WHETHER COMPLICATED: Primary | ICD-10-CM

## 2020-04-16 PROCEDURE — 74011250636 HC RX REV CODE- 250/636: Performed by: NURSE PRACTITIONER

## 2020-04-16 PROCEDURE — 96372 THER/PROPH/DIAG INJ SC/IM: CPT

## 2020-04-16 RX ADMIN — BENRALIZUMAB 30 MG: 30 INJECTION, SOLUTION SUBCUTANEOUS at 09:24

## 2020-04-16 NOTE — PROGRESS NOTES
[facility] Progress Note    Date: 2020    Name: Alona Koo    MRN: [id number]         : 1970    Darlina Sheerer Injection    Ms. Lalit Gibson was assessed and education was provided. Ms. Andrew Solomon vitals were reviewed and patient was observed for 5 minutes prior to treatment. Visit Vitals  /73 (BP 1 Location: Left arm, BP Patient Position: Sitting)   Pulse (!) 104   Temp 98.1 °F (36.7 °C)   Resp 18   SpO2 97%   Breastfeeding No     Fasenra 30mg was administered subcutaneous in left upper arm. Band-aid placed at site. Ms. Lalit Gibson tolerated well, and had no complaints. Patient armband removed and shredded. Ms. Lalit Gibson was discharged from [place] [address] in stable condition at 0930. She is to return on 20 at 0930 for her next appointment.        Latasha Vaz RN  2020  0930

## 2020-05-07 ENCOUNTER — APPOINTMENT (OUTPATIENT)
Dept: INFUSION THERAPY | Age: 50
End: 2020-05-07
Payer: MEDICARE

## 2020-05-07 DIAGNOSIS — J45.50 SEVERE PERSISTENT ASTHMA, UNSPECIFIED WHETHER COMPLICATED: ICD-10-CM

## 2020-05-14 ENCOUNTER — HOSPITAL ENCOUNTER (OUTPATIENT)
Dept: INFUSION THERAPY | Age: 50
Discharge: HOME | End: 2020-05-14
Payer: MEDICARE

## 2020-05-14 VITALS
DIASTOLIC BLOOD PRESSURE: 67 MMHG | OXYGEN SATURATION: 98 % | HEART RATE: 92 BPM | TEMPERATURE: 98.3 F | RESPIRATION RATE: 18 BRPM | SYSTOLIC BLOOD PRESSURE: 106 MMHG

## 2020-05-14 DIAGNOSIS — J45.50 SEVERE PERSISTENT ASTHMA, UNSPECIFIED WHETHER COMPLICATED: Primary | ICD-10-CM

## 2020-05-14 PROCEDURE — 96372 THER/PROPH/DIAG INJ SC/IM: CPT

## 2020-05-14 PROCEDURE — 74011250636 HC RX REV CODE- 250/636: Performed by: NURSE PRACTITIONER

## 2020-05-14 RX ADMIN — BENRALIZUMAB 30 MG: 30 INJECTION, SOLUTION SUBCUTANEOUS at 09:20

## 2020-05-14 NOTE — PROGRESS NOTES
[facility] Progress Note    Date: May 14, 2020    Name: Theador Alpers    MRN: [id number]         : 1970    Parish Garcia Injection    Ms. Chris Nicholson was assessed and education was provided. Ms. Ana Broussard vitals were reviewed and patient was observed for 5 minutes prior to treatment. Visit Vitals  /67 (BP 1 Location: Left arm, BP Patient Position: Sitting)   Pulse 92   Temp 98.3 °F (36.8 °C)   Resp 18   SpO2 98%   Breastfeeding No     Fasenra 30mg was administered subcutaneous in left upper arm. Band-aid placed at site. Ms. Chris Nicholson tolerated well, and had no complaints. Patient armband removed and shredded. Ms. Chris Nicholson was discharged from [place] [address] in stable condition at 9561. She is to return on 2020 at 0930 for her next appointment.        Josephine Ramirez RN  May 14, 2020  4807

## 2020-06-22 ENCOUNTER — VIRTUAL VISIT (OUTPATIENT)
Dept: FAMILY MEDICINE CLINIC | Age: 50
End: 2020-06-22

## 2020-06-22 DIAGNOSIS — Z12.11 COLON CANCER SCREENING: ICD-10-CM

## 2020-06-22 DIAGNOSIS — Z71.89 ADVANCED DIRECTIVES, COUNSELING/DISCUSSION: ICD-10-CM

## 2020-06-22 DIAGNOSIS — Z00.00 MEDICARE ANNUAL WELLNESS VISIT, INITIAL: Primary | ICD-10-CM

## 2020-06-22 NOTE — ACP (ADVANCE CARE PLANNING)
Advance Care Planning       Advance Care Planning (ACP) Physician/NP/PA (Provider) Conversation        Date of ACP Conversation: 6/22/2020    Conversation Conducted with:   Patient with Decision Making Irma Gomes Maker:    Current Designated Health Care Decision Maker:   (If there is a valid [place] named in the [address]" box in the ACP activity, but it is not visible above, be sure to open that field and then select the health care decision maker relationship (ie \"primary\") in the blank space to the right of the name.)    Note: Assess and validate information in current ACP documents, as indicated. If no Authorized Decision Maker has previously been identified, then patient chooses [place]:  \"Who would you like to name as your primary health care decision-maker? \"    Name: Jasvir Rodríguez   Relationship:  Phone number: [phone]  Will [place] this person be reached easily? \" YES  \"Who would you like to name as your back-up decision maker? \"  None  Name: N/A  Relationship: N/A Phone number: N/A  \"Can this person be reached easily? \" N/A    Note: If the relationship of these Decision-Makers to the patient does NOT follow your state's Next of Kin hierarchy, recommend that patient complete ACP document that meets state-specific requirements to allow them to act on the patient's behalf when appropriate. Care Preferences:    Hospitalization: \"If your health worsens and it becomes clear that your chance of recovery is unlikely, what would your preference be regarding hospitalization? \"  If the patient would want hospitalization, answer \"yes\". If the patient would prefer comfort-focused treatment without hospitalization, answer \"no\". yes      Ventilation:   \"If you were in your present state of health and suddenly became very ill and were unable to breathe on your own, what would your preference be about the use of a ventilator (breathing machine) if it was available to you? \"    If patient would desire the use of a ventilator (breathing machine), answer \"yes\", if not answer \"no\":yes    \"If your health worsens and it becomes clear that your chance of recovery is unlikely, what would your preference be about the use of a ventilator (breathing machine) if it was available to you? \"   Continue with plan for use of ventilator      Resuscitation:  \"CPR works best to restart the heart when there is a sudden event, like a heart attack, in someone who is otherwise healthy. Unfortunately, CPR does not typically restart the heart for people who have serious health conditions or who are very sick. \"    \"In the event your heart stopped as a result of an underlying serious health condition, would you want attempts to be made to restart your heart (answer \"yes\" for attempt to resuscitate) or would you prefer a natural death (answer \"no\" for do not attempt to resuscitate)? \"   yes    NOTE: If the patient has a valid advance directive AND provides care preference(s) that are inconsistent with that prior directive, advise the patient to consider either: creating a new advance directive that complies with state-specific requirements; or, if that is not possible, orally revoking that prior directive in accordance with state-specific requirements, which must be documented in the EHR.     Conversation Outcomes / Follow-Up Plan:   Completion of Advance Directive (Will provide paperwork at next visit)      Length of Voluntary ACP Conversation in minutes:  <16 minutes (Non-Billable)      Kiara Parsons MD

## 2020-06-22 NOTE — PROGRESS NOTES
This is an Initial Medicare Annual Wellness Exam (AWV) (Performed 12 months after IPPE or effective date of Medicare Part B enrollment, Once in a lifetime)    Consent: Margaret Ngo, who was seen by synchronous (real-time) audio only technology, and/or her healthcare decision maker, is aware that this patient-initiated, Telehealth encounter on 2020 is a billable service. While AWVs are fully covered by Medicare, any services rendered on this date that are not included in an AWV are subject to additional billing, with coverage as determined by her insurance carrier. She is aware that she may receive a bill for any such additional services and has provided verbal consent to proceed: Yes. I have reviewed the patient's medical history in detail and updated the computerized patient record. History     Patient Active Problem List   Diagnosis Code    Persistent asthma without complication Q45.252    Anxiety and depression F41.9, F32.9    Gastroesophageal reflux disease K21.9    Hypothyroidism due to Hashimoto's thyroiditis E03.8, E06.3    Severe persistent asthma J45.50     Past Medical History:   Diagnosis Date    Allergies     Anxiety     Asthma     Depression     GERD (gastroesophageal reflux disease)     Goiter, nodular 11/15/2016    Hiatal hernia     Hypothyroidism due to Hashimoto's thyroiditis 2017    Insomnia     Morbid obesity ([facility] [address]       Past Surgical History:   Procedure Laterality Date    HX  SECTION      HX  SECTION      HX TUBAL LIGATION       Current Outpatient Medications   Medication Sig Dispense Refill    levothyroxine (SYNTHROID) 125 mcg tablet Take 1 Tab by mouth Daily (before breakfast). 90 Tab 1    QUEtiapine (SEROQUEL) 300 mg tablet Take 1 Tab by mouth nightly. 90 Tab 3    montelukast (SINGULAIR) 10 mg tablet Take 1 Tab by mouth daily.  Indications: inflammation of the nose due to an allergy 90 Tab 2    diclofenac EC (VOLTAREN) 75 mg EC tablet Take 1 Tab by mouth two (2) times a day. 60 Tab 2    albuterol (PROVENTIL VENTOLIN) 2.5 mg /3 mL (0.083 %) nebu by Nebulization route.  albuterol (VENTOLIN HFA) 90 mcg/actuation inhaler Take  by inhalation.  budesonide-formoterol (SYMBICORT) 160-4.5 mcg/actuation HFAA Take 2 Puffs by inhalation two (2) times a day.  tiotropium (SPIRIVA WITH HANDIHALER) 18 mcg inhalation capsule Take 1 Cap by inhalation daily.  citalopram (CELEXA) 20 mg tablet Take 1 Tab by mouth daily. 90 Tab 3    raNITIdine (ZANTAC) 300 mg tab Take 1 Tab by mouth nightly. 80 Tab 3     No Known Allergies    Family History   Problem Relation Age of Onset    Thyroid Disease Father     Thyroid Disease Sister     Thyroid Cancer Sister 45     Social History     Tobacco Use    Smoking status: Never Smoker    Smokeless tobacco: Never Used   Substance Use Topics    Alcohol use: Never     Frequency: Never       Depression Risk Factor Screening:     3 most recent PHQ Screens 6/22/2020   Little interest or pleasure in doing things Several days   Feeling down, depressed, irritable, or hopeless Several days   Total Score PHQ 2 2       Alcohol Risk Factor Screening:   Do you average 1 drink per night or more than 7 drinks a week:  No    On any one occasion in the past three months have you have had more than 3 drinks containing alcohol:  No      Functional Ability and Level of Safety:   Hearing: Hearing is good. Whisper Test done with normal results. Activities of Daily Living: The home contains: no safety equipment. Patient does total self care     Ambulation: with no difficulty      Fall Risk:  Fall Risk Assessment, last 12 mths 6/22/2020   Able to walk? Yes   Fall in past 12 months?  No       Abuse Screen:  Patient is not abused       Cognitive Screening   Has your family/caregiver stated any concerns about your memory: no     Cognitive Screening: Normal    Patient Care Team   Patient Care Team:  Wero Aj MD as PCP - General (Family Practice)  Wero Aj MD as PCP - [facility] Empaneled Provider  Molina Roberts MD (Pulmonary Disease)    Assessment/Plan   Education and counseling provided:  Are appropriate based on today's review and evaluation  End-of-Life planning (with patient's consent)  Colorectal cancer screening tests    Diagnoses and all orders for this visit:    1. Medicare annual wellness visit, initial  -     FULL CODE    2. Advanced directives, counseling/discussion  -     FULL CODE    3. Colon cancer screening  -     COLOGUARD TEST (FECAL DNA COLORECTAL CANCER SCREENING)           Marci Solano is a 48 y.o. female who was evaluated by an audio only encounter for concerns as above. Patient identification was verified prior to start of the visit. A caregiver was present when appropriate. Due to this being a TeleHealth encounter (During DIDQX-22 public health emergency), evaluation of the following organ systems was limited: Vitals/Constitutional/EENT/Resp/CV/GI//MS/Neuro/Skin/Heme-Lymph-Imm. Pursuant to the emergency declaration under the [facility] waiver authority and the [employer] and [employer] Act, this Virtual Visit was conducted, with patient's (and/or legal guardian's) consent, to reduce the patient's risk of exposure to COVID-19 and provide necessary medical care. Services were provided through a synchronous discussion virtually to substitute for in-person clinic visit. I was in the office. The patient was at home.         Kiara Parsons MD

## 2020-06-22 NOTE — PATIENT INSTRUCTIONS
Medicare Wellness Visit, Female The best way to live healthy is to have a lifestyle where you eat a well-balanced diet, exercise regularly, limit alcohol use, and quit all forms of tobacco/nicotine, if applicable. Regular preventive services are another way to keep healthy. Preventive services (vaccines, screening tests, monitoring & exams) can help personalize your care plan, which helps you manage your own care. Screening tests can find health problems at the earliest stages, when they are easiest to treat. [facility] follows the current, evidence-based guidelines published by the [facility] Zurdo Douglas ([facility]) when recommending preventive services for our patients. Because we follow these guidelines, sometimes recommendations change over time as research supports it. (For example, mammograms used to be recommended annually. Even though Medicare will still pay for an annual mammogram, the newer guidelines recommend a mammogram every two years for women of average risk). Of course, you and your doctor may decide to screen more often for some diseases, based on your risk and your co-morbidities (chronic disease you are already diagnosed with). Preventive services for you include: - Medicare offers their members a free annual wellness visit, which is time for you and your primary care provider to discuss and plan for your preventive service needs. Take advantage of this benefit every year! 
-All adults over the age of 72 should receive the recommended pneumonia vaccines. Current USPSTF guidelines recommend a series of two vaccines for the best pneumonia protection.  
-All adults should have a flu vaccine yearly and a tetanus vaccine every 10 years.  
-All adults age 48 and older should receive the shingles vaccines (series of two vaccines). -All adults age 38-68 who are overweight should have a diabetes screening test once every three years. -All adults born between [birth year] and 1965 should be screened once for Hepatitis C. 
-Other screening tests and preventive services for persons with diabetes include: an eye exam to screen for diabetic retinopathy, a kidney function test, a foot exam, and stricter control over your cholesterol.  
-Cardiovascular screening for adults with routine risk involves an electrocardiogram (ECG) at intervals determined by your doctor.  
-Colorectal cancer screenings should be done for adults age 54-65 with no increased risk factors for colorectal cancer. There are a number of acceptable methods of screening for this type of cancer. Each test has its own benefits and drawbacks. Discuss with your doctor what is most appropriate for you during your annual wellness visit. The different tests include: colonoscopy (considered the best screening method), a fecal occult blood test, a fecal DNA test, and sigmoidoscopy. 
 
-A bone mass density test is recommended when a woman turns 65 to screen for osteoporosis. This test is only recommended one time, as a screening. Some providers will use this same test as a disease monitoring tool if you already have osteoporosis. -Breast cancer screenings are recommended every other year for women of normal risk, age 54-69. 
-Cervical cancer screenings for women over age 72 are only recommended with certain risk factors. Here is a list of your current Health Maintenance items (your personalized list of preventive services) with a due date: 
Health Maintenance Due Topic Date Due  
 Annual Well Visit  07/17/2019  Colon Cancer Stool Test  06/14/2020

## 2020-06-22 NOTE — PROGRESS NOTES
1. Have you been to the ER, urgent care clinic since your last visit? Hospitalized since your last visit? No    2. Have you seen or consulted any other health care providers outside of the [address] [place] [address] since your last visit? Include any pap smears or colon screening.  Yes Reason for visit: follow up with Dr. Stefanie Ramachandran Pulmonology

## 2020-06-25 PROBLEM — J45.909 PERSISTENT ASTHMA WITHOUT COMPLICATION: Status: RESOLVED | Noted: 2019-10-25 | Resolved: 2020-06-25

## 2020-07-02 DIAGNOSIS — J45.50 SEVERE PERSISTENT ASTHMA, UNSPECIFIED WHETHER COMPLICATED: ICD-10-CM

## 2020-07-09 ENCOUNTER — APPOINTMENT (OUTPATIENT)
Dept: INFUSION THERAPY | Age: 50
End: 2020-07-09

## 2020-07-10 DIAGNOSIS — R19.5 POSITIVE COLORECTAL CANCER SCREENING USING COLOGUARD TEST: Primary | ICD-10-CM

## 2020-07-10 NOTE — PROGRESS NOTES
Patient contacted via phone and notified of positive Cologuard test.  Advised on recommendations for further follow-up. Referred to GI specialist for further evaluation.       Herbie Morris MD   7/10/2020 3:02 PM

## 2020-08-27 DIAGNOSIS — J45.50 SEVERE PERSISTENT ASTHMA, UNSPECIFIED WHETHER COMPLICATED: ICD-10-CM

## 2020-09-08 ENCOUNTER — VIRTUAL VISIT (OUTPATIENT)
Dept: FAMILY MEDICINE CLINIC | Age: 50
End: 2020-09-08

## 2020-09-08 NOTE — PROGRESS NOTES
1. Have you been to the ER, urgent care clinic since your last visit? Hospitalized since your last visit? No    2. Have you seen or consulted any other health care providers outside of the [address] [place] [address] since your last visit? Include any pap smears or colon screening.  No

## 2020-09-09 ENCOUNTER — VIRTUAL VISIT (OUTPATIENT)
Dept: FAMILY MEDICINE CLINIC | Age: 50
End: 2020-09-09

## 2020-09-09 DIAGNOSIS — F32.A ANXIETY AND DEPRESSION: ICD-10-CM

## 2020-09-09 DIAGNOSIS — Z13.6 ENCOUNTER FOR LIPID SCREENING FOR CARDIOVASCULAR DISEASE: ICD-10-CM

## 2020-09-09 DIAGNOSIS — Z13.220 ENCOUNTER FOR LIPID SCREENING FOR CARDIOVASCULAR DISEASE: ICD-10-CM

## 2020-09-09 DIAGNOSIS — E03.8 HYPOTHYROIDISM DUE TO HASHIMOTO'S THYROIDITIS: ICD-10-CM

## 2020-09-09 DIAGNOSIS — F41.9 ANXIETY AND DEPRESSION: ICD-10-CM

## 2020-09-09 DIAGNOSIS — E06.3 HYPOTHYROIDISM DUE TO HASHIMOTO'S THYROIDITIS: ICD-10-CM

## 2020-09-09 DIAGNOSIS — Z79.899 OTHER LONG TERM (CURRENT) DRUG THERAPY: ICD-10-CM

## 2020-09-09 DIAGNOSIS — R20.0 NUMBNESS OF RIGHT FOOT: Primary | ICD-10-CM

## 2020-09-09 DIAGNOSIS — J45.50 SEVERE PERSISTENT ASTHMA, UNSPECIFIED WHETHER COMPLICATED: ICD-10-CM

## 2020-09-09 DIAGNOSIS — K21.9 GASTROESOPHAGEAL REFLUX DISEASE, ESOPHAGITIS PRESENCE NOT SPECIFIED: ICD-10-CM

## 2020-09-09 RX ORDER — QUETIAPINE FUMARATE 300 MG/1
300 TABLET, FILM COATED ORAL
Qty: 90 TAB | Refills: 2 | Status: SHIPPED | OUTPATIENT
Start: 2020-09-09

## 2020-09-09 RX ORDER — DICLOFENAC SODIUM 75 MG/1
75 TABLET, DELAYED RELEASE ORAL 2 TIMES DAILY
Qty: 180 TAB | Refills: 1 | Status: SHIPPED | OUTPATIENT
Start: 2020-09-09

## 2020-09-09 RX ORDER — LEVOTHYROXINE SODIUM 125 UG/1
125 TABLET ORAL
Qty: 90 TAB | Refills: 1 | Status: SHIPPED | OUTPATIENT
Start: 2020-09-09 | End: 2021-01-19 | Stop reason: SDUPTHER

## 2020-09-09 RX ORDER — CITALOPRAM 20 MG/1
20 TABLET, FILM COATED ORAL DAILY
Qty: 90 TAB | Refills: 2 | Status: SHIPPED | OUTPATIENT
Start: 2020-09-09

## 2020-09-09 NOTE — PROGRESS NOTES
1. Have you been to the ER, urgent care clinic since your last visit? Hospitalized since your last visit? No    2. Have you seen or consulted any other health care providers outside of the [address] [place] [address] since your last visit? Include any pap smears or colon screening.  No         Patient changing pharmacy to [facility]

## 2020-09-09 NOTE — PROGRESS NOTES
[facility]    CC: F/U for Chronic Disease Management    HPI:     Kisha Ann, who was evaluated through a synchronous (real-time) audio-video encounter, and/or her healthcare decision maker, is aware that it is a billable service, with coverage as determined by her insurance carrier. She provided verbal consent to proceed: Yes, and patient identification was verified. It was conducted pursuant to the emergency declaration under the [facility], [address] [place] [address] and the [employer] Act. A caregiver was present when appropriate. Ability to conduct physical exam was limited. I was at home. The patient was at home.       Anxiety and Depression:  -Not seeing any mental health specialist  -Taking psychiatric medications as prescribed  -Denies any side effects or issues with the medication  -Reports mood has been well controlled with the medications       GERD:  -No longer taking any ranitidine for issue  -Has been taking Gaviscon as needed for issue  -Denies any side effects or issues with the medication  -Reports symptoms are well controlled with medication       Hypothyroidism:  -Taking levothyroxine as prescribed  -Denies any side effects or issues with the medication  -TSH last checked on 2/5/2020 and was normal       Asthma:  -Being managed by pulmonary specialist  -Last saw specialist in July  -No changes were done to her medications at that visit  -States that her next appointment will be October  -Has been using her controller medications as prescribed   -Denies any side effects or issues with the medications   -Reports her asthma has been doing well      Numbness:  -Issue started in August  -Has been constant  -Is isolated to right heel  -Denies any associated symptoms  -Reports being diagnosed with a heel spur 2 years ago      ROS: Positive items marked in RED  CON: fever, chills  Cardiovascular: palpitations, CP  Resp: SOB, cough  GI: nausea, vomiting, diarrhea  : dysuria, hematuria    Past Medical History:   Diagnosis Date    Allergies     Anxiety     Depression     GERD (gastroesophageal reflux disease)     Goiter, nodular 11/15/2016    Hiatal hernia     Hypothyroidism due to Hashimoto's thyroiditis 2017    Insomnia     Morbid obesity ([address]     Severe persistent asthma        Past Surgical History:   Procedure Laterality Date    HX  SECTION      HX  SECTION      HX TUBAL LIGATION         Family History   Problem Relation Age of Onset    Thyroid Disease Father     Thyroid Disease Sister     Thyroid Cancer Sister 45       Social History     Tobacco Use    Smoking status: Never Smoker    Smokeless tobacco: Never Used   Substance Use Topics    Alcohol use: Never     Frequency: Never    Drug use: Never       No Known Allergies      Current Outpatient Medications:     levothyroxine (SYNTHROID) 125 mcg tablet, Take 1 Tab by mouth Daily (before breakfast). , Disp: 90 Tab, Rfl: 1    QUEtiapine (SEROQUEL) 300 mg tablet, Take 1 Tab by mouth nightly., Disp: 90 Tab, Rfl: 3    montelukast (SINGULAIR) 10 mg tablet, Take 1 Tab by mouth daily. Indications: inflammation of the nose due to an allergy, Disp: 90 Tab, Rfl: 2    diclofenac EC (VOLTAREN) 75 mg EC tablet, Take 1 Tab by mouth two (2) times a day., Disp: 60 Tab, Rfl: 2    albuterol (PROVENTIL VENTOLIN) 2.5 mg /3 mL (0.083 %) nebu, by Nebulization route., Disp: , Rfl:     albuterol (VENTOLIN HFA) 90 mcg/actuation inhaler, Take  by inhalation. , Disp: , Rfl:     budesonide-formoterol (SYMBICORT) 160-4.5 mcg/actuation HFAA, Take 2 Puffs by inhalation two (2) times a day., Disp: , Rfl:     tiotropium (SPIRIVA WITH HANDIHALER) 18 mcg inhalation capsule, Take 1 Cap by inhalation daily. , Disp: , Rfl:     citalopram (CELEXA) 20 mg tablet, Take 1 Tab by mouth daily. , Disp: 90 Tab, Rfl: 3   raNITIdine (ZANTAC) 300 mg tab, Take 1 Tab by mouth nightly., Disp: 90 Tab, Rfl: 3    Physical Exam:      General: obese habitus, NAD, conversant  Eyes: sclera clear bilaterally, no discharge noted, eyelids normal in appearance, EOMI  HENT: NCAT  Neck: no masses visualized, trachea appears to be midline  Lungs: normal respiratory effort and rate, No visualized signs of difficulty breathing or respiratory distress  MS: normal AROM of neck noted  Skin: no significant exanthematous lesions or discoloration noted on neck/facial skin    Psych: alert and oriented to person, place and situation, normal affect  Neuro: speech normal, moving all upper extremities, no gaze palsy, no facial asymmetry (Cranial nerve 7 motor function) (limited exam due to video visit)      Assessment/Plan       Numbness of Right Foot:  -Ability to assess issue is significantly limited by nature of visit type  -Suspect possible tarsal tunnel syndrome  -DDx is varied and includes inadequately treated hypothyroidism, development of diabetes, and mineral deficiencies (These are unlikely given unilateral presentation)  -Will start work up with TSH, HGBA1c and CMP  -F/U in 2 weeks for numbness in heel      Anxiety and Depression, well controlled:  -Will continue current psychiatric medication regimen  -F/U in 2 weeks for numbness in heel      Hypothyroidism, likely well controlled:  -Secondary to Hashimoto's thyroiditis  -Will continue current levothyroxine regimen  -TSH ordered  -F/U in 2 weeks for numbness in heel      GERD:  -Will officially discontinue prior ranitidine regimen  -F/U in 2 weeks for numbness in heel      Severe Persistent Asthma:  -Will defer management to pulmonary specialist  -Need to obtain records from pulmonary specialist for review  -F/U in 2 weeks for numbness in heel      Bart Potts is a 48 y.o. female being evaluated by a Virtual Visit (video visit) encounter to address concerns as mentioned above.   A caregiver was present when appropriate. Due to this being a TeleHealth encounter (During MXSKJ-60 public health emergency), evaluation of the following organ systems was limited: Vitals/Constitutional/EENT/Resp/CV/GI//MS/Neuro/Skin/Heme-Lymph-Imm. Pursuant to the emergency declaration under the [address] [place] [address], [address] [place] [address] and the [employer] and [employer] Act, this Virtual Visit was conducted with patient's (and/or legal guardian's) consent, to reduce the risk of exposure to COVID-19 and provide necessary medical care. Services were provided through a video synchronous discussion virtually to substitute for in-person encounter. --Neela Adamson MD on 9/9/2020 at 1:42 PM    An electronic signature was used to authenticate this note.

## 2020-09-19 LAB
ALBUMIN SERPL-MCNC: 4.1 G/DL (ref 3.8–4.8)
ALBUMIN/GLOB SERPL: 1.3 {RATIO} (ref 1.2–2.2)
ALP SERPL-CCNC: 114 IU/L (ref 39–117)
ALT SERPL-CCNC: 33 IU/L (ref 0–32)
AST SERPL-CCNC: 30 IU/L (ref 0–40)
BILIRUB SERPL-MCNC: 0.8 MG/DL (ref 0–1.2)
BUN SERPL-MCNC: 10 MG/DL (ref 6–24)
BUN/CREAT SERPL: 10 (ref 9–23)
CALCIUM SERPL-MCNC: 9.2 MG/DL (ref 8.7–10.2)
CHLORIDE SERPL-SCNC: 99 MMOL/L (ref 96–106)
CHOLEST SERPL-MCNC: 189 MG/DL (ref 100–199)
CO2 SERPL-SCNC: 25 MMOL/L (ref 20–29)
CREAT SERPL-MCNC: 0.99 MG/DL (ref 0.57–1)
ERYTHROCYTE [DISTWIDTH] IN BLOOD BY AUTOMATED COUNT: 14 % (ref 11.7–15.4)
EST. AVERAGE GLUCOSE BLD GHB EST-MCNC: 120 MG/DL
GLOBULIN SER CALC-MCNC: 3.2 G/DL (ref 1.5–4.5)
GLUCOSE SERPL-MCNC: 91 MG/DL (ref 65–99)
HBA1C MFR BLD: 5.8 % (ref 4.8–5.6)
HCT VFR BLD AUTO: 40.7 % (ref 34–46.6)
HDLC SERPL-MCNC: 39 MG/DL
HGB BLD-MCNC: 13.2 G/DL (ref 11.1–15.9)
INTERPRETATION, 910389: NORMAL
LDLC SERPL CALC-MCNC: 125 MG/DL (ref 0–99)
MCH RBC QN AUTO: 26.3 PG (ref 26.6–33)
MCHC RBC AUTO-ENTMCNC: 32.4 G/DL (ref 31.5–35.7)
MCV RBC AUTO: 81 FL (ref 79–97)
PLATELET # BLD AUTO: 288 X10E3/UL (ref 150–450)
POTASSIUM SERPL-SCNC: 4.9 MMOL/L (ref 3.5–5.2)
PROT SERPL-MCNC: 7.3 G/DL (ref 6–8.5)
RBC # BLD AUTO: 5.01 X10E6/UL (ref 3.77–5.28)
SODIUM SERPL-SCNC: 138 MMOL/L (ref 134–144)
TRIGL SERPL-MCNC: 137 MG/DL (ref 0–149)
TSH SERPL DL<=0.005 MIU/L-ACNC: 4.23 UIU/ML (ref 0.45–4.5)
VLDLC SERPL CALC-MCNC: 25 MG/DL (ref 5–40)
WBC # BLD AUTO: 7 X10E3/UL (ref 3.4–10.8)

## 2020-09-25 ENCOUNTER — OFFICE VISIT (OUTPATIENT)
Dept: FAMILY MEDICINE CLINIC | Age: 50
End: 2020-09-25
Payer: MEDICARE

## 2020-09-25 VITALS
BODY MASS INDEX: 46.6 KG/M2 | SYSTOLIC BLOOD PRESSURE: 128 MMHG | OXYGEN SATURATION: 98 % | WEIGHT: 216 LBS | RESPIRATION RATE: 16 BRPM | HEIGHT: 57 IN | TEMPERATURE: 97.5 F | HEART RATE: 82 BPM | DIASTOLIC BLOOD PRESSURE: 69 MMHG

## 2020-09-25 DIAGNOSIS — R73.03 PREDIABETES: ICD-10-CM

## 2020-09-25 DIAGNOSIS — Z23 NEEDS FLU SHOT: ICD-10-CM

## 2020-09-25 DIAGNOSIS — E06.3 HYPOTHYROIDISM DUE TO HASHIMOTO'S THYROIDITIS: ICD-10-CM

## 2020-09-25 DIAGNOSIS — E66.01 OBESITY, MORBID (HCC): ICD-10-CM

## 2020-09-25 DIAGNOSIS — E03.8 HYPOTHYROIDISM DUE TO HASHIMOTO'S THYROIDITIS: ICD-10-CM

## 2020-09-25 DIAGNOSIS — E78.5 DYSLIPIDEMIA (HIGH LDL; LOW HDL): ICD-10-CM

## 2020-09-25 DIAGNOSIS — G57.51 TARSAL TUNNEL SYNDROME, RIGHT: Primary | ICD-10-CM

## 2020-09-25 PROCEDURE — G0008 ADMIN INFLUENZA VIRUS VAC: HCPCS | Performed by: FAMILY MEDICINE

## 2020-09-25 PROCEDURE — 99214 OFFICE O/P EST MOD 30 MIN: CPT | Performed by: FAMILY MEDICINE

## 2020-09-25 PROCEDURE — 90686 IIV4 VACC NO PRSV 0.5 ML IM: CPT | Performed by: FAMILY MEDICINE

## 2020-09-25 NOTE — PROGRESS NOTES
[facility]    CC: F/U for Chronic Disease Management    HPI:     Hypothyroidism:  -Got requested lab work  -Taking levothyroxine as prescribed  -Denies any side effects or issue with the medication      Morbid Obesity:  -Got requested lab work  -On no weight loss medication  -Not following any regular exercise regimen  -Diet is unchanged from last visit      Numbness of Right Foot:  -Got requested lab work  -Issue remains isolated to medial side of ankle/foot      ROS: Positive items marked in RED  CON: fever, chills  Cardiovascular: palpitations, CP  Resp: SOB, cough  GI: nausea, vomiting, diarrhea  : dysuria, hematuria    Past Medical History:   Diagnosis Date    Allergies     Anxiety     Depression     GERD (gastroesophageal reflux disease)     Goiter, nodular 11/15/2016    Hiatal hernia     Hypothyroidism due to Hashimoto's thyroiditis 2017    Insomnia     Morbid obesity ([address]     Severe persistent asthma        Past Surgical History:   Procedure Laterality Date    HX  SECTION      HX  SECTION      HX TUBAL LIGATION         Family History   Problem Relation Age of Onset    Thyroid Disease Father     Thyroid Disease Sister     Thyroid Cancer Sister 45       Social History     Tobacco Use    Smoking status: Never Smoker    Smokeless tobacco: Never Used   Substance Use Topics    Alcohol use: Never     Frequency: Never    Drug use: Never       No Known Allergies      Current Outpatient Medications:     QUEtiapine (SEROquel) 300 mg tablet, Take 1 Tab by mouth nightly., Disp: 90 Tab, Rfl: 2    citalopram (CeleXA) 20 mg tablet, Take 1 Tab by mouth daily. , Disp: 90 Tab, Rfl: 2    levothyroxine (SYNTHROID) 125 mcg tablet, Take 1 Tab by mouth Daily (before breakfast). , Disp: 90 Tab, Rfl: 1    diclofenac EC (VOLTAREN) 75 mg EC tablet, Take 1 Tab by mouth two (2) times a day., Disp: 180 Tab, Rfl: 1    montelukast (SINGULAIR) 10 mg tablet, Take 1 Tab by mouth daily. Indications: inflammation of the nose due to an allergy, Disp: 90 Tab, Rfl: 2    albuterol (PROVENTIL VENTOLIN) 2.5 mg /3 mL (0.083 %) nebu, by Nebulization route., Disp: , Rfl:     albuterol (VENTOLIN HFA) 90 mcg/actuation inhaler, Take  by inhalation. , Disp: , Rfl:     budesonide-formoterol (SYMBICORT) 160-4.5 mcg/actuation HFAA, Take 2 Puffs by inhalation two (2) times a day., Disp: , Rfl:     tiotropium (SPIRIVA WITH HANDIHALER) 18 mcg inhalation capsule, Take 1 Cap by inhalation daily. , Disp: , Rfl:     Physical Exam:      /72   Pulse 82   Temp 97.5 °F (36.4 °C) (Temporal)   Resp 16   Ht 4' 9\" (1.448 m)   Wt 216 lb (98 kg)   LMP 09/25/2019 Comment: Tubal Ligation  SpO2 98%   BMI 46.74 kg/m²     General: obese habitus, NAD, conversant  Eyes: sclera clear bilaterally, no discharge noted, eyelids normal in appearance  HENT: NCAT  Lungs: CTAB, normal respiratory effort and rate  CV: RRR, no MRGs  ABD: soft, non-tender, non-distended, normal bowel sounds  Ext: no peripheral edema or digital cyanosis noted  Skin: normal temperature, turgor, color, and texture  Psych: alert and oriented to person, place and situation, normal affect  Neuro: speech normal, moving all extremities, gait normal    Results for [facility] (MRN [id number]):   Ref.  Range 9/18/2020 09:49   WBC Latest Ref Range: 3.4 - 10.8 x10E3/uL 7.0   RBC Latest Ref Range: 3.77 - 5.28 x10E6/uL 5.01   HGB Latest Ref Range: 11.1 - 15.9 g/dL 13.2   HCT Latest Ref Range: 34.0 - 46.6 % 40.7   MCV Latest Ref Range: 79 - 97 fL 81   MCH Latest Ref Range: 26.6 - 33.0 pg 26.3 (L)   MCHC Latest Ref Range: 31.5 - 35.7 g/dL 32.4   RDW Latest Ref Range: 11.7 - 15.4 % 14.0   PLATELET Latest Ref Range: 150 - 450 x10E3/uL 288   Sodium Latest Ref Range: 134 - 144 mmol/L 138   Potassium Latest Ref Range: 3.5 - 5.2 mmol/L 4.9   Chloride Latest Ref Range: 96 - 106 mmol/L 99   CO2 Latest Ref Range: 20 - 29 mmol/L 25 Glucose Latest Ref Range: 65 - 99 mg/dL 91   BUN Latest Ref Range: 6 - 24 mg/dL 10   Creatinine Latest Ref Range: 0.57 - 1.00 mg/dL 0.99   BUN/Creatinine ratio Latest Ref Range: 9 - 23  10   Calcium Latest Ref Range: 8.7 - 10.2 mg/dL 9.2   GFR est non-AA Latest Ref Range: >59 mL/min/1.73 67   GFR est AA Latest Ref Range: >59 mL/min/1.73 77   Bilirubin, total Latest Ref Range: 0.0 - 1.2 mg/dL 0.8   Protein, total Latest Ref Range: 6.0 - 8.5 g/dL 7.3   Albumin Latest Ref Range: 3.8 - 4.8 g/dL 4.1   A-G Ratio Latest Ref Range: 1.2 - 2.2  1.3   ALT Latest Ref Range: 0 - 32 IU/L 33 (H)   AST Latest Ref Range: 0 - 40 IU/L 30   Alk.  phosphatase Latest Ref Range: 39 - 117 IU/L 114   Triglyceride Latest Ref Range: 0 - 149 mg/dL 137   Cholesterol, total Latest Ref Range: 100 - 199 mg/dL 189   HDL Cholesterol Latest Ref Range: >39 mg/dL 39 (L)   LDL Cholesterol Latest Ref Range: 0 - 99 mg/dL 125 (H)   Hemoglobin A1c, (calculated) Latest Ref Range: 4.8 - 5.6 % 5.8 (H)   Estimated average glucose Latest Units: mg/dL 120   TSH Latest Ref Range: 0.450 - 4.500 uIU/mL 4.230       Assessment/Plan       Right Tarsal Tunnel Syndrome, unchanged:  -Suspected diagnosis  -Referred to podiatry for further evaluation/management  -Handout given on tarsal tunnel syndrome care  -Follow-up in 1 month      Dyslipidemia:  -10 year ASCVD risk calculated to be1.9%  -Counseled on ASCVD risk and AHA/ACC recommendations  -Currently no indication to start on statin therapy  -Handout given on HLD care  -Follow-up in 1 month       Prediabetes:  -Counseled on diagnosis  -Advises on recommended management with lifestyle changes  -Handout given on prediabetes care  -Follow-up in 1 month      Hypothyroidism, well controlled:  -Will continue current levothyroxine regimen  -Follow-up in 1 month       Morbid Obesity, worsening:  -Has gained 7 pounds since her last visit  -Encouraged to work on recommended lifestyle changes  -Handouts given on low carb diet/foods, starting a weight loss plan, and calorie restriction  -Follow-up in 1 month      Health Maintenance:  -Influenza vaccine administered during visit      Sae Quiroga MD  9/25/2020, 9:29 AM

## 2020-09-25 NOTE — PATIENT INSTRUCTIONS
Prediabetes: Instrucciones de cuidado Prediabetes: Care Instructions Instrucciones de cuidado La prediabetes es karen señal de advertencia de que usted está en riesgo de llegar a tener diabetes tipo 2. [place] significa que saleh nivel de azúcar en la mary es más alto de lo que debiera ser. Los alimentos que usted come se convierten en azúcar, la cual saleh cuerpo [place] para obtener energía. Normalmente, un órgano llamado páncreas produce insulina, la cual permite que el azúcar en la mary llegue a las células del cuerpo. Vincent cuando el cuerpo no puede utilizar la [employer], el azúcar no entra en las células. En cambio, se queda en [place] [employer]. [place] se conoce xochitl resistencia a la insulina. La acumulación de azúcar en la mary causa prediabetes. Lo jameson es que hacer cambios en saleh estilo de stevie puede ayudarle a hacer que el azúcar en la mary vuelva a un nivel normal y puede ayudarle a evitar o retrasar la diabetes. La atención de seguimiento es karen parte clave de saleh tratamiento y seguridad. Asegúrese de hacer y acudir a todas las citas, y llame a saleh médico si está teniendo problemas. También es karen buena idea saber los resultados de arnold exámenes y mantener karen lista de los medicamentos que marilyn. Cómo puede cuidarse en el hogar? · Vigile saleh peso. Un peso saludable ayuda al organismo a usar la insulina de la [place]. · Limite la cantidad de calorías, dulces y grasas poco saludables que come. Pregunte a saleh médico si debería consultar a un dietista. Un dietista registrado puede ayudarle a elaborar planes de alimentación que se adapten a saleh estilo de stevie. · Guilherme por lo menos 30 minutos de ejercicio la mayoría de los días de la [place]. El ejercicio ayuda a controlar el azúcar en saleh mary. También ayuda a mantener un peso saludable. Caminar es karen buena opción.  Es posible que también DUVED hacer otras actividades, xochitl correr, nadar, montar en bicicleta, o jugar al tenis u otros deportes de equipo. · No fume. Fumar puede empeorar la prediabetes. Si necesita ayuda para dejar de fumar, hable con saleh médico sobre programas y medicamentos para dejar de fumar. Estos pueden aumentar arnold probabilidades de dejar el hábito para siempre. · Si saleh médico le recetó medicamentos, tómelos exactamente xochitl se lo indicó. Llame a saleh médico si mary estar teniendo problemas con saleh medicamento. Recibirá [employer] medicamentos específicos recetados por saleh médico. 

Cuándo debe pedir ayuda? Preste especial atención a los cambios en saleh mohsen y asegúrese de comunicarse con saleh médico si: 
  · Tiene cualquier síntoma de diabetes. Estos síntomas podrían incluir: ? Tener sed con más frecuencia. ? [facility]. ? [employer]. ? [employer]. ? Sentirse muy cansado. ? Tener visión borrosa.  
  · Tiene karen herida que no cicatriza.  
  · Tiene karen infección que no desaparece.  
  · Tiene problemas con saleh presión arterial.  
  · Desea más información sobre la diabetes y cómo evitarla. Dónde puede encontrar más información en inglés? Thena Fetch a [email or web address]/ Escriba I222 en la búsqueda para aprender más acerca de \"Prediabetes: Instrucciones de cuidado. \" Revisado: 20 diciembre, 2019               Versión del contenido: 12.6 © 7135-7001 Healthwise, Incorporated. Las instrucciones de cuidado fueron adaptadas bajo licencia por Good Help Connections (which disclaims liability or warranty for this information). Si usted tiene [place] afección médica o sobre estas instrucciones, siempre pregunte a saleh profesional de mohsen. Healthwise, Incorporated niega toda garantía o responsabilidad por saleh uso de esta información. Aprenda sobre el colesterol alto Learning About High Cholesterol Qué es el colesterol alto? El colesterol es un tipo de grasa que está presente en la [place].  Es necesario para muchas funciones corporales, xochitl producir nuevas células. El colesterol se produce en el cuerpo y [place] proviene de los alimentos que se consumen. Si usted tiene [employer], tanner comienza a acumularse en yue arterias. [place] se llama endurecimiento de las arterias o aterosclerosis. El colesterol alto eleva saleh riesgo de tener un ataque al corazón y un ataque cerebral. 
Hay diferentes tipos de colesterol. El LDL es el colesterol \"lore\". [place] Janusz el LDL puede elevar saleh riesgo de tener karen enfermedad cardíaca, un ataque Gayathri Hoes y un ataque cerebral. El HDL es el colesterol \"jameson\". Un HDL alto está vinculado con un riesgo más bajo de tener enfermedades cardíacas, un ataque al corazón y un ataque cerebral. 
Yue niveles de colesterol ayudan a saleh médico a determinar saleh riesgo de tener un ataque cardíaco o un ataque cerebral. 

Cómo puede prevenir el colesterol alto? Un estilo de stevie saludable para el corazón puede ayudarle a prevenir el colesterol alto. Tanner estilo de stevie ayuda a reducir saleh riesgo de tener un ataque Gayathri Hoes y un ataque cerebral. 
· Coma alimentos saludables para el corazón. ? Coma frutas, verduras, granos integrales, frijoles y otros alimentos ricos en fibra. ? Coma proteínas magras, xochitl mariscos, sara [place], frijoles, nueces y productos de soya. ? Coma grasas saludables, xochitl aceite de canola y de [place]. ? Elija alimentos que tengan un bajo contenido de grasas saturadas y [place] grasas trans. ? Limite el sodio y el alcohol. ? [address] y los alimentos con azúcar añadida. · Margot Savage. Trate de hacer actividad moderada al menos 2½ horas a la semana. O trate de hacer actividad intensa al menos 1¼ horas a la semana. Chano vez desee caminar o probar otras actividades, xochitl correr, nadar, [employer] en bicicleta o jugar al tenis o practicar deportes de equipo. · Mantenga un peso saludable. Baje de peso si lo necesita. · No fume. Si necesita ayuda para dejar de fumar, hable con saleh médico sobre programas y medicamentos para dejar de fumar. Estos pueden aumentar arnold probabilidades de dejar el hábito para siempre. Cómo se trata el colesterol alto? La meta de saleh tratamiento es reducir arnold probabilidades de tener un ataque al corazón o un ataque cerebral. La meta no es solamente reducir arnodl cifras de colesterol. · Chano vez pueda hacer cambios de estilo de stevie saludables, xochitl comer alimentos saludables, no fumar, adelgazar y [address] · Es posible que tenga que sarah medicamentos. La atención de seguimiento es karen parte clave de saleh tratamiento y seguridad. Asegúrese de hacer y acudir a todas las citas, y llame a saleh médico si está teniendo problemas. También es karen buena idea saber los resultados de arnold exámenes y mantener karen lista de los medicamentos que marilyn. Dónde puede encontrar más información en inglés? Luisa Anchors a [email or web address]/ Maria T N971 en la búsqueda para aprender más acerca de \"Aprenda sobre el colesterol alto. \" Revisado: 16 ferdinand, 8466               FXQZQWA del contenido: 12.6 © 5620-3646 Healthwise, Incorporated. Las instrucciones de cuidado fueron adaptadas bajo licencia por Good [employer] Connections (which disclaims liability or warranty for this information). Si usted tiene [place] afección médica o sobre estas instrucciones, siempre pregunte a saleh profesional de mohsen. Healthwise, Incorporated niega toda garantía o responsabilidad por saleh uso de esta información. Cómo comenzar un plan para bajar de peso: Instrucciones de cuidado Starting a Weight Loss Plan: Care Instructions Instrucciones de cuidado Si está pensando en adelgazar, puede que le resulte difícil saber por dónde empezar. Saleh médico puede ayudarle a preparar un plan para bajar de peso que se ajuste mejor a arnold necesidades.  Es posible que prefiera sarah karen clase de nutrición o ejercicio, o unirse a un sindy de apoyo para adelgazar. Si tiene preguntas sobre cómo cambiar arnold hábitos alimenticios o rutina de ejercicio, pregúntele a saleh médico sobre la posibilidad de consultar a un dietista registrado o a un especialista en ejercicio. Bajar de peso puede ser un gran desafío. No tiene que hacer grandes cambios de repente. Judith Reid y [place]. Cuando esos cambios se conviertan en un hábito, incorpore algunos [employer]. Si mary que no está listo para hacer cambios en tanner momento, escoja karen fecha en el futuro. Guilherme karen yareli con saleh médico para determinar si es apropiado que comience un plan para bajar de [place]. La atención de seguimiento es karen parte clave de saelh tratamiento y seguridad. Asegúrese de hacer y acudir a todas las citas, y llame a saleh médico si está teniendo problemas. También es karen buena idea saber los resultados de arnold exámenes y mantener karen lista de los medicamentos que marilyn. Cómo puede cuidarse en el hogar? · Establezca metas realistas. Muchas personas esperan perder más peso del que es probable. Perder el 5% a 10% del peso corporal podría ser suficiente para mejorar saleh mohsen. · Guilherme que saleh mt y arnold amigos se involucren para brindarle apoyo. Hable con ellos sobre las razones por las que Yadi Shilpi y [address] Pueden ayudarle si participan en arnold rutinas de ejercicio y comen con usted, aunque es posible que coman algo diferente. · Busque lo que funcione mejor para usted. Si no tiene tiempo o no le gusta cocinar, un programa que ofrezca barras o batidos xochitl sustitutos de comida podría ser el más adecuado para usted. Vincent si le gusta cocinar, lo mejor puede ser un plan que incluya menús y recetas diarios. · Pregúntele a saleh médico acerca de otros profesionales de la mohsen que puedan ayudarle a alcanzar arnold objetivos para bajar de [place]. ? Un dietista puede ayudarle a introducir cambios saludables en saleh dieta. ? Un especialista en ejercicio o entrenador personal pueden ayudarle a desarrollar un programa de ejercicio Deloria Estee y seguro. ? Un consejero o psiquiatra puede ayudarle a lidiar con la depresión, la ansiedad u otros problemas familiares que puedan interponerse en redman propósito para adelgazar. · Considere unirse a un sindy de [de-identified] de personas que tratan de adelgazar. Redman médico puede recomendarle grupos de apoyo en redman localidad. Dónde puede encontrar más información en inglés? Brooke Wise a [email or web address] Jeovanny Burgess U748 en la búsqueda para aprender más acerca de \"Cómo comenzar un plan para bajar de peso: Instrucciones de cuidado. \" Revisado: 11 ferdinand, 8375               OABIEL del contenido: 12.6 © 7541-3926 Healthwise, Incorporated. Las instrucciones de cuidado fueron adaptadas bajo licencia por Good Help Connections (which disclaims liability or warranty for this information). Si usted tiene [place] afección médica o sobre estas instrucciones, siempre pregunte a redman profesional de mohsen. Healthwise, Incorporated niega toda garantía o responsabilidad por redman uso de esta información. Aprenda acerca de la reducción calórica Learning About Cutting Calories Cómo afectan las calorías al peso? Los alimentos proporcionan energía al cuerpo. La energía de los alimentos que usted come se mide en calorías. Esta energía mantiene latiendo al corazón, activo al cerebro y en funcionamiento a los músculos. Redman cuerpo necesita karen determinada cantidad de calorías cada día. Karen vez que redman cuerpo [place] las calorías que necesita, [address] las calorías adicionales en forma de [place]. Para bajar de peso en forma craig, usted tiene que comer menos calorías a la vez que se alimenta de manera saludable. Cuántas calorías necesita usted cada día? Cuanta más actividad hace, más calorías necesita. Cuando hace menos actividad, necesita menos calorías.  La cantidad de calorías que necesita cada día también depende de varios factores, xochitl saleh edad y si es hombre o Anna. Estas son algunas pautas generales para adultos: 
· Floydene Cynthia y los adultos mayores necesitan entre 1,600 y 2,000 calorías al día. · Las mujeres activas y los hombres menos activos necesitan entre 2,000 y 2,400 calorías al día. · Los hombres activos necesitan entre 2,400 y 3,000 calorías al día. Cómo puede usted reducir arnold calorías y comer alimentos saludables? Los granos integrales, las verduras y las frutas, y los frijoles secos son buenos alimentos con menos calorías. Aportan muchos nutrientes y [place]. Y le harán sentir lleno. Los [place], las bebidas energéticas y las gaseosas (sodas) tienen muchas calorías. Le aportan pocos nutrientes y [place]. Trate de [employer], los jugos de fruta y las bebidas energéticas. Mejor domenic agua. Algunas grasas pueden ser parte de karen dieta herve. Vincent reducir las grasas que provienen de alimentos altamente procesados, xochitl las comidas rápidas y muchos refrigerios ([place]"), es karen buena manera de reducir las calorías en saleh Leitha Sevin. Además, use menores cantidades de [address], [place], aderezos de [place] y [facility]. Agregue ajo fresco, valerio o hierbas a arnold alimentos para darles sabor sin añadir grasa. [place] y los productos lácteos pueden ser karen gavino importante de grasas ocultas. Opte por versiones magras o bajas en grasa de estos productos. Las [place], los [place], las les fritas y los helados de agua sin grasa aún pueden tener un alto contenido de azúcar y calorías. Algunos alimentos sin grasa tienen más calorías que arnold versiones comunes. Coma refrigerios y golosinas sin grasa con moderación, xochitl lo haría con otros alimentos. Si arnold alimentos favoritos son ricos en grasa, sal, azúcar o calorías, limite la frecuencia con la que los come.  Coma porciones más pequeñas o busque sustitutos saludables. Coma muchas frutas, verduras y granos integrales. [place] en casa · Use la carne xochitl guarnición, en lugar de que sea la parte principal de saleh comida. · Pruebe platos principales que contengan pasta integral, arroz integral, frijoles secos o verduras. · Encuentre maneras de cocinar los alimentos con poca grasa o sin grasa, xochitl al horno, al vapor o a la brittni. · Use aceite para cocinar en aerosol en vez de líquido. Si utiliza aceite, utilice un aceite monoinsaturado, xochitl el aceite de canola o el aceite de [place]. · Quite la grasa de la carne antes de cocinarla. · Escurra la grasa después de dorar la carne o mientras la asa. · Enfríe las sopas y los cocidos después de cocinarlos. Luego, quite la grasa de la parte de arriba después de que se endurezca. [place] fuera de casa · Pida alimentos al horno o escalfados, en lugar de fritos o rebozados. · Reduzca la cantidad de mantequilla o margarina que le pone al pan. · [address] salsas de carne espesadas (\"gravies\") y los aderezos para ensaladas aparte, y [place] solo karen pequeña cantidad. · Cuando pida pastas, elija salsa de tomate en lugar de salsas cremosas. · Pida salsa con la papa horneada en lugar de [place], [place], [facility] o [facility]. · Pida las comidas en tamaño pequeño en lugar de en tamaño adri. · Comparta un plato principal o llévese parte de la comida a casa para comerla en otra ocasión. · Comparta los aperitivos y postres. Dónde puede encontrar más información en inglés? Euel Putty a [email or web address]/ Escriba V914 en la búsqueda para aprender más acerca de \"Aprenda acerca de la reducción calórica. \" Revisado: 22 anthony, 0358               QVZEGLC del contenido: 12.6 © 8097-9885 [employer]P) Limitedwise, Incorporated. Las instrucciones de cuidado fueron adaptadas bajo licencia por Good [facility] Connections (which disclaims liability or warranty for this information). Si usted tiene [place] afección médica o sobre estas instrucciones, siempre pregunte a saleh profesional de mohsen. [facility], Incorporated niega toda garantía o responsabilidad por saleh uso de esta información. Aprenda sobre alimentos con bajo contenido de carbohidratos Learning About Low-Carbohydrate Foods Qué alimentos tienen bajo contenido de carbohidratos? Los alimentos que usted come contienen nutrientes, xochitl vitaminas y [place]. El carbohidrato es un nutriente. Saleh cuerpo necesita la cantidad [place] para mantenerse larissa y funcionar xochitl debería. Usted [employer] lista a continuación xochitl ayuda para decidir qué alimentos comer. Algunos alimentos que tienen baja cantidad de carbohidratos incluyen: 
Lácteos y alternativas a lácteos · Stana Getachew · Albertus Rashaun · Queso de untar (\"cream cheese\") · Leche de nueces (sin endulzar) · Leche de soya (sin endulzar) · Yogur (tipo digna, natural) Denita Failing · [place] (palta) · [place] Laura y otros alimentos con proteínas · Almendras · Carne de res · Miguel Angel · [place] S[place] · Huevos · Fletán · Mantequilla de cacahuate (maní) y otras mantequillas de nueces · Pistachos · Cerdo (puerco) · Semillas de calabaza · Tofu · Charlean Aland · Atún · [employer] · Holt (walnuts) Roselind Speaker · Brócoli · Veria Cooler · Coliflor · Ejotes (judías verdes) · Hongos · Pimientos · Verduras de hojas verdes · [place] · Tomates Colabore con saleh médico para determinar qué cantidad de tanner nutriente necesita. Según saleh omhsen, candie vez necesite más o menos de él en saleh alimentación. Dónde puede encontrar más información en inglés? Mariah Ruff a [email or web address]/ Sharmaineba C470 en la búsqueda para aprender más acerca de \"Aprenda sobre alimentos con bajo contenido de carbohidratos. \" Revisado: 22 anthony, 2647               ZNWAIFP del contenido: 12.6 © 5408-7193 [facility], Incorporated. Las instrucciones de cuidado fueron adaptadas bajo licencia por Good [facility] Connections (which disclaims liability or warranty for this information). Si usted tiene [place] afección médica o sobre estas instrucciones, siempre pregunte a saleh profesional de mohsen. [facility], Incorporated niega toda garantía o responsabilidad por saleh uso de esta información. Aprenda acerca de las dietas bajas en carbohidratos Learning About Low-Carbohydrate Diets Qué es karen dieta baja en carbohidratos? Karen dieta baja en carbohidratos, o dieta hipoglucídica, limita los alimentos y las bebidas que contienen carbohidratos. [place] incluye granos, frutas, Jr Nakayama y yogur, y verduras con almidón xochitl les, frijoles y [address]. También deondre los alimentos y las bebidas que contienen azúcar añadida. En saleh lugar, las dietas bajas en carbohidratos incluyen alimentos ricos en proteínas y grasas. Por qué podría usted llevar karen dieta baja en carbohidratos? Venida Code bajas en carbohidratos se pueden usar por muchas razones, xochitl para bajar de [place]. Las personas que tienen diabetes podrían usar karen dieta baja en carbohidratos para ayudar a controlar arnold niveles de azúcar en la mary. Qué debe hacer antes de comenzar la dieta? Hable con saleh médico antes de probar cualquier dieta. [place] es aún más importante si tiene problemas de mohsen xochitl enfermedad renal, enfermedad cardíaca o diabetes. Saleh médico puede sugerirle que consulte con un dietista registrado. Un dietista puede ayudarle a elaborar un plan de alimentación que le funcione a usted. Qué alimentos se comen al seguir karen dieta baja en carbohidratos? Al seguir karen dieta baja en carbohidratos, usted opta por alimentos que tommie ricos en proteínas y grasas. Entre los ejemplos se [place] siguientes: · Laura, pescados y aves. · Huevos. · [place] variadas, xochitl nueces de tiffany, Neena E Chidi Garcia, almmanuel y weiuate [place]). · Mantequilla de cacahuate (maní) y otras mantequillas de nueces. · Tofu. · [place] (palta). · [place]. · Verduras sin almidón xochitl brócoli, coliflor, judías verdes, champiñones, pimientos, navin y espinacas. · Leches no lácteas no azucaradas xochitl la [place] de almendras y la [place] de [place]. 
· [place], requesón y queso crema. Revisado: 22 agosto, 2079               DUANE del contenido: 12.6 © 8244-4902 Healthwise, Incorporated. Las instrucciones de cuidado fueron adaptadas bajo licencia por Good Help Connections (which disclaims liability or warranty for this information). Si usted tiene [place] afección médica o sobre estas instrucciones, siempre pregunte a salhe profesional de mohsen. Healthwise, Incorporated niega toda garantía o responsabilidad por saleh uso de esta información. Síndrome del túnel del tarso: Instrucciones de cuidado Tarsal Tunnel Syndrome: Care Instructions Generalidades El síndrome del túnel del tarso es karen compresión o pinzamiento del nervio tibial. Shari nervio se extiende por la parte posterior de la pierna hasta el lado interior del [facility]. Esta benita de la articulación del tobillo es donde se encuentran de manera compleja nervios, tendones y ligamentos. [place] aumenta las probabilidades de que el nervio tibial sufra un pinzamiento. Ciertas cosas pueden aumentar saleh riesgo de compresión del nervio. Estas incluyen: · Karen lesión al tobillo. · Estar de pie mucho tiempo. · Ser un deportista. · Girar el tobillo hacia adentro al caminar o correr (pronación). · Karen masa de tejido o quiste. · Inflamación o hinchazón en la benita. Los síntomas incluyen un dolor ardiente en el pie. También puede [employer], entumecimiento y hormigueo en la planta o el arco del pie. Al principio, el tratamiento puede incluir reposo, hielo y medicamentos antiinflamatorios no esteroideos ([place]), xochitl ibuprofeno o naproxeno. También puede incluir artículos ortopédicos de apoyo para el pie. Franca ejemplos, se incluyen plantillas de soporte para el arco, plantillas ortopédicas a medida y calzado de apoyo. Saleh médico podría sugerir fisioterapia. Si estos tratamientos no ayudan, podrían infiltrarlo con esteroides o un medicamento para el dolor del nervio. Si estos tratamientos no ayudan a aliviar arnold síntomas, usted podría necesitar cirugía. La atención de seguimiento es karen parte clave de saleh tratamiento y seguridad. Asegúrese de hacer y acudir a todas las citas, y llame a saleh médico si está teniendo problemas. También es karen buena idea saber los resultados de arnold exámenes y mantener karen lista de los medicamentos que marilyn. Cómo puede cuidarse en el hogar? · Si puede, interrumpa o reduzca la actividad que causa los síntomas. Si no puede dejar de [employer], tome descansos a menudo. Use estas pausas para descansar y estirar el pie y el tobillo. · Pregúntele a saleh médico si puede sarah un analgésico (medicamento para el dolor) de venta regis, franca acetaminofén (Tylenol), ibuprofeno (Advil, Motrin) o naproxeno (Aleve). Sea rosario con los medicamentos. Bria y siga todas las instrucciones de la [place]. · Para aliviar el dolor, aplíquese hielo o karen compresa fría en el pie y CHIP. Guilherme esto ziggy 10 a 20 minutos cada vez. Póngase un paño alcala entre el hielo y la piel. · Si saleh médico o fisioterapeuta le indica que use karen [place], un soporte para el arco o karen plantilla, úselo según las indicaciones. [place] ayudará a mantener el pie y el tobillo en karen posición neutra. También shahzad la presión sobre el nervio tibial. 
· Pregúntele a saleh médico si debería hacer sesiones de fisioterapia. Cuándo debe pedir ayuda? Preste especial atención a los cambios en saleh mohsen y asegúrese de comunicarse con saleh médico si: 
  · Saleh dolor u otros problemas no mejoran con los cuidados en el hogar.  
  · Desea saber más sobre fisioterapia.   · Tiene cualquier problema con arnold medicamentos. Dónde puede encontrar más información en inglés? Haven Reyes a [email or web address] Escriba T175 en la búsqueda para aprender más acerca de \"Síndrome del túnel del tarso: Instrucciones de cuidado. \" Revisado: 2 marzo, 2020               Versión del contenido: 12.6 © 4886-2266 [employer] Las instrucciones de cuidado fueron adaptadas bajo licencia por [employer] (which disclaims liability or warranty for this information). Si usted tiene [place] afección médica o sobre estas instrucciones, siempre pregunte a saleh profesional de mohsen. [employer] niega toda garantía o responsabilidad por asleh uso de esta información.

## 2020-09-25 NOTE — PROGRESS NOTES
1. Have you been to the ER, urgent care clinic since your last visit? Hospitalized since your last visit? No    2. Have you seen or consulted any other health care providers outside of the [address] [place] [address] since your last visit? Include any pap smears or colon screening. No      Patient was given VIS for review, consent was obtained and per orders of Dr. Jesús Camarillo , injection of Flulaval given by Richelle Rodriguez LPN. Patient observed. No signs nor symptoms of any adverse reactions. Patient tolerated injection well.

## 2020-10-22 DIAGNOSIS — J45.50 SEVERE PERSISTENT ASTHMA, UNSPECIFIED WHETHER COMPLICATED: ICD-10-CM

## 2020-11-25 ENCOUNTER — HOSPITAL ENCOUNTER (OUTPATIENT)
Dept: INFUSION THERAPY | Age: 50
End: 2020-11-25

## 2020-11-25 DIAGNOSIS — J45.50 SEVERE PERSISTENT ASTHMA, UNSPECIFIED WHETHER COMPLICATED: ICD-10-CM

## 2020-12-08 DIAGNOSIS — J45.50 SEVERE PERSISTENT ASTHMA, UNSPECIFIED WHETHER COMPLICATED: ICD-10-CM

## 2020-12-09 ENCOUNTER — APPOINTMENT (OUTPATIENT)
Dept: INFUSION THERAPY | Age: 50
End: 2020-12-09

## 2021-01-19 DIAGNOSIS — E03.8 HYPOTHYROIDISM DUE TO HASHIMOTO'S THYROIDITIS: ICD-10-CM

## 2021-01-19 DIAGNOSIS — E06.3 HYPOTHYROIDISM DUE TO HASHIMOTO'S THYROIDITIS: ICD-10-CM

## 2021-01-19 RX ORDER — LEVOTHYROXINE SODIUM 125 UG/1
125 TABLET ORAL
Qty: 90 TAB | Refills: 1 | Status: SHIPPED | OUTPATIENT
Start: 2021-01-19

## 2021-01-19 NOTE — TELEPHONE ENCOUNTER
Patient was provided the name and phone of [facility] ([place] speaking Physician  . ... Aria Granados

## 2022-03-19 PROBLEM — E66.01 OBESITY, MORBID (HCC): Status: ACTIVE | Noted: 2020-09-25

## 2022-03-19 PROBLEM — E03.8 HYPOTHYROIDISM DUE TO HASHIMOTO'S THYROIDITIS: Status: ACTIVE | Noted: 2020-01-27

## 2022-03-19 PROBLEM — J45.50 SEVERE PERSISTENT ASTHMA: Status: ACTIVE | Noted: 2020-03-11

## 2022-03-19 PROBLEM — F41.9 ANXIETY AND DEPRESSION: Status: ACTIVE | Noted: 2019-10-25

## 2022-03-19 PROBLEM — F32.A ANXIETY AND DEPRESSION: Status: ACTIVE | Noted: 2019-10-25

## 2022-03-19 PROBLEM — K21.9 GASTROESOPHAGEAL REFLUX DISEASE: Status: ACTIVE | Noted: 2019-10-25

## 2022-03-19 PROBLEM — E06.3 HYPOTHYROIDISM DUE TO HASHIMOTO'S THYROIDITIS: Status: ACTIVE | Noted: 2020-01-27

## 2022-04-12 ENCOUNTER — TRANSCRIBE ORDER (OUTPATIENT)
Dept: SCHEDULING | Age: 52
End: 2022-04-12

## 2022-04-12 DIAGNOSIS — Z12.31 VISIT FOR SCREENING MAMMOGRAM: Primary | ICD-10-CM

## 2022-04-14 ENCOUNTER — HOSPITAL ENCOUNTER (OUTPATIENT)
Dept: WOMENS IMAGING | Age: 52
Discharge: HOME | End: 2022-04-14
Attending: FAMILY MEDICINE
Payer: MEDICARE

## 2022-04-14 DIAGNOSIS — Z12.31 VISIT FOR SCREENING MAMMOGRAM: ICD-10-CM

## 2022-04-14 PROCEDURE — 77063 BREAST TOMOSYNTHESIS BI: CPT

## 2023-03-28 ENCOUNTER — TRANSCRIBE ORDERS (OUTPATIENT)
Facility: HOSPITAL | Age: 53
End: 2023-03-28

## 2023-03-28 DIAGNOSIS — Z12.31 VISIT FOR SCREENING MAMMOGRAM: Primary | ICD-10-CM

## 2023-04-04 ENCOUNTER — HOSPITAL ENCOUNTER (OUTPATIENT)
Dept: WOMENS IMAGING | Facility: HOSPITAL | Age: 53
Discharge: HOME | End: 2023-04-07
Payer: MEDICARE

## 2023-04-04 DIAGNOSIS — Z12.31 VISIT FOR SCREENING MAMMOGRAM: ICD-10-CM

## 2023-04-04 PROCEDURE — 77063 BREAST TOMOSYNTHESIS BI: CPT

## 2024-04-08 ENCOUNTER — HOSPITAL ENCOUNTER (OUTPATIENT)
Dept: WOMENS IMAGING | Facility: HOSPITAL | Age: 54
Discharge: HOME | End: 2024-04-11
Payer: MEDICARE

## 2024-04-08 DIAGNOSIS — Z12.31 SCREENING MAMMOGRAM FOR BREAST CANCER: ICD-10-CM

## 2024-04-08 PROCEDURE — 77063 BREAST TOMOSYNTHESIS BI: CPT

## 2024-10-16 ENCOUNTER — TRANSCRIBE ORDERS (OUTPATIENT)
Facility: HOSPITAL | Age: 54
End: 2024-10-16

## 2024-10-16 DIAGNOSIS — Z12.31 OTHER SCREENING MAMMOGRAM: Primary | ICD-10-CM

## 2025-04-09 ENCOUNTER — HOSPITAL ENCOUNTER (OUTPATIENT)
Dept: WOMENS IMAGING | Facility: HOSPITAL | Age: 55
Discharge: HOME | End: 2025-04-12
Payer: MEDICARE

## 2025-04-09 DIAGNOSIS — Z12.31 OTHER SCREENING MAMMOGRAM: ICD-10-CM

## 2025-04-09 PROCEDURE — 77067 SCR MAMMO BI INCL CAD: CPT
